# Patient Record
Sex: FEMALE | Race: WHITE
[De-identification: names, ages, dates, MRNs, and addresses within clinical notes are randomized per-mention and may not be internally consistent; named-entity substitution may affect disease eponyms.]

---

## 2020-01-18 ENCOUNTER — HOSPITAL ENCOUNTER (INPATIENT)
Dept: HOSPITAL 53 - M ED | Age: 20
LOS: 3 days | Discharge: HOME | DRG: 809 | End: 2020-01-21
Attending: INTERNAL MEDICINE | Admitting: INTERNAL MEDICINE
Payer: COMMERCIAL

## 2020-01-18 VITALS — DIASTOLIC BLOOD PRESSURE: 70 MMHG | SYSTOLIC BLOOD PRESSURE: 130 MMHG

## 2020-01-18 VITALS — SYSTOLIC BLOOD PRESSURE: 115 MMHG | DIASTOLIC BLOOD PRESSURE: 67 MMHG

## 2020-01-18 VITALS — HEIGHT: 64 IN | BODY MASS INDEX: 20.51 KG/M2 | WEIGHT: 120.15 LBS

## 2020-01-18 DIAGNOSIS — C76.2: ICD-10-CM

## 2020-01-18 DIAGNOSIS — Z88.0: ICD-10-CM

## 2020-01-18 DIAGNOSIS — R13.10: ICD-10-CM

## 2020-01-18 DIAGNOSIS — E87.6: ICD-10-CM

## 2020-01-18 DIAGNOSIS — R42: ICD-10-CM

## 2020-01-18 DIAGNOSIS — B37.81: ICD-10-CM

## 2020-01-18 DIAGNOSIS — D70.9: Primary | ICD-10-CM

## 2020-01-18 DIAGNOSIS — C79.51: ICD-10-CM

## 2020-01-18 DIAGNOSIS — Z79.899: ICD-10-CM

## 2020-01-18 DIAGNOSIS — D61.810: ICD-10-CM

## 2020-01-18 LAB
ALBUMIN SERPL BCG-MCNC: 2.5 GM/DL (ref 3.2–5.2)
ALT SERPL W P-5'-P-CCNC: 7 U/L (ref 12–78)
ANISOCYTOSIS BLD QL SMEAR: (no result)
APPEARANCE UR: (no result)
APTT BLD: 56 SECONDS (ref 25–38.4)
BACTERIA UR QL AUTO: NEGATIVE
BILIRUB CONJ SERPL-MCNC: 0.2 MG/DL (ref 0–0.2)
BILIRUB SERPL-MCNC: 0.8 MG/DL (ref 0.2–1)
BILIRUB UR QL STRIP.AUTO: NEGATIVE
BUN SERPL-MCNC: 12 MG/DL (ref 7–18)
CALCIUM SERPL-MCNC: 8.5 MG/DL (ref 8.5–10.1)
CHLORIDE SERPL-SCNC: 100 MEQ/L (ref 98–107)
CO2 SERPL-SCNC: 27 MEQ/L (ref 21–32)
CREAT SERPL-MCNC: 0.36 MG/DL (ref 0.55–1.3)
EOSINOPHIL NFR BLD MANUAL: 3 % (ref 0–3)
FERRITIN SERPL-MCNC: 490 NG/ML (ref 8–252)
FIBRINOGEN PPP-MCNC: 768 MG/DL (ref 221–452)
FLUAV RNA UPPER RESP QL NAA+PROBE: NEGATIVE
FLUBV RNA UPPER RESP QL NAA+PROBE: NEGATIVE
GIANT PLATELETS BLD QL SMEAR: (no result)
GLUCOSE SERPL-MCNC: 78 MG/DL (ref 70–100)
GLUCOSE UR QL STRIP.AUTO: NEGATIVE MG/DL
HCT VFR BLD AUTO: 27.3 % (ref 36–47)
HGB BLD-MCNC: 8.5 G/DL (ref 12–15.5)
HGB UR QL STRIP.AUTO: (no result)
INR PPP: 1.42
IRON SATN MFR SERPL: 26 % (ref 13.2–45)
IRON SERPL-MCNC: 53 UG/DL (ref 50–170)
KETONES UR QL STRIP.AUTO: (no result) MG/DL
LEUKOCYTE ESTERASE UR QL STRIP.AUTO: NEGATIVE
LYMPHOCYTES NFR BLD MANUAL: 70 % (ref 16–44)
MCH RBC QN AUTO: 26.6 PG (ref 27–33)
MCHC RBC AUTO-ENTMCNC: 31.1 G/DL (ref 32–36.5)
MCV RBC AUTO: 85.3 FL (ref 80–96)
METAMYELOCYTES NFR BLD MANUAL: 1 % (ref 0–0)
MONOCYTES NFR BLD MANUAL: 7 % (ref 0–5)
MUCOUS THREADS URNS QL MICRO: (no result)
NEUTROPHILS NFR BLD MANUAL: 4 % (ref 28–66)
NITRITE UR QL STRIP.AUTO: NEGATIVE
PH UR STRIP.AUTO: 5 UNITS (ref 5–9)
PLATELET # BLD AUTO: 62 10^3/UL (ref 150–450)
PLATELET BLD QL SMEAR: (no result)
POTASSIUM SERPL-SCNC: 3.7 MEQ/L (ref 3.5–5.1)
PROT SERPL-MCNC: 6.5 GM/DL (ref 6.4–8.2)
PROT UR QL STRIP.AUTO: (no result) MG/DL
PROTHROMBIN TIME: 17.1 SECONDS (ref 11.8–14)
RBC # BLD AUTO: 3.2 10^6/UL (ref 4–5.4)
RBC # UR AUTO: 182 /HPF (ref 0–3)
SODIUM SERPL-SCNC: 136 MEQ/L (ref 136–145)
SP GR UR STRIP.AUTO: 1.03 (ref 1–1.03)
SQUAMOUS #/AREA URNS AUTO: 0 /HPF (ref 0–6)
TIBC SERPL-MCNC: 204 UG/DL (ref 250–450)
UROBILINOGEN UR QL STRIP.AUTO: 2 MG/DL (ref 0–2)
VARIANT LYMPHS NFR BLD MANUAL: 7 % (ref 0–5)
WBC # BLD AUTO: 1 10^3/UL (ref 4–10)
WBC #/AREA URNS AUTO: 10 /HPF (ref 0–3)

## 2020-01-18 RX ADMIN — ACETAMINOPHEN SCH MG: 500 TABLET ORAL at 20:20

## 2020-01-18 RX ADMIN — GABAPENTIN SCH MG: 300 CAPSULE ORAL at 20:19

## 2020-01-18 RX ADMIN — MORPHINE SULFATE PRN MG: 100 TABLET, EXTENDED RELEASE ORAL at 20:18

## 2020-01-18 RX ADMIN — FLUCONAZOLE SCH MG: 100 TABLET ORAL at 20:19

## 2020-01-18 RX ADMIN — MEROPENEM AND SODIUM CHLORIDE SCH MLS/HR: 1 INJECTION, SOLUTION INTRAVENOUS at 22:00

## 2020-01-18 RX ADMIN — SODIUM CHLORIDE SCH MLS/HR: 9 INJECTION, SOLUTION INTRAVENOUS at 18:06

## 2020-01-18 RX ADMIN — DOCUSATE SODIUM SCH MG: 100 CAPSULE, LIQUID FILLED ORAL at 20:21

## 2020-01-18 NOTE — REP
PORTABLE CHEST X-RAY:  SITTING AP VIEW.

 

HISTORY:  Systemic inflammatory response syndrome.

 

FINDINGS:  Right hemidiaphragm is somewhat elevated, and there is some

homogeneous opacity in the right base suggesting possible small right pleural

effusion.  A right-sided Mpfxyj-I-Hscd catheter is noted in place with its tip in

the expected location of the superior vena cava.  The left lung is clear.

Pleural angles are sharp.  Pulmonary vasculature is not increased.

 

IMPRESSION:

 

Elevated right hemidiaphragm.  Hazy opacity right base suggestive of pleural

fluid.  Otherwise no acute disease.  Jhzixi-J-Jxge catheter.

 

 

Electronically Signed by

David Chaney MD 01/18/2020 03:07 P

## 2020-01-18 NOTE — HPEPDOC
[facility] Medical History & Physical


Date of Admission


Jan 18, 2020


Date of Service:  Jan 18, 2020


Attending Physician:  GONDAL,KHUBAIB N. MD





History and Physical


CHIEF COMPLAINT: Fever, dysphagia & dizziness





HISTORY OF PRESENT ILLNESS: 19 y.o female w/ recently diagnosed high grade 

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 

round of chemo ~1 week ago presents from home with low grade fever, dizziness, 

dysphagia & abdominal pain. She was diagnosed & treated at [place], felt 

fatigued post discharge but no other issues up until yesterday. Symptoms began 

with fever, worsening fatigue & abdominal pain. She was also being treated for 

oral thrush w/ nystatin and now reports dysphagia; she also has associated 

chills. She received Cyclophosphamide, Doxorubicin & vincristine on 1/9. She has

been giving herself Neupogen daily at home for the past 3 days. She followed up 

with our local oncologist (Dr. Wellington) two days ago at which time she was 

asymptomatic and advised to come to the hospital if she developed a fever. In 

the ED, she is found to have pancytopenia with Absolute neutrophil count of 120.

Of note, she had a vaginal delivery on 12/31/19 and has been having 

small/moderate amount of vaginal bleeding since then. She denies any CP, nausea,

vomiting or diarrhea at this time. She has not had a BM in the past 4 days. 





10 point review of system is negative except for above.





PAST MEDICAL HISTORY:


1. High grade malignant small blue round cell tumor


2. Hypothyroidism





PAST SURGICAL HISTORY:


1. Infusaport placement





SOCIAL HISTORY:


never smoker


denies alcohol use


denies drug use





FAMILY HISTORY:


negative for cancer or heart disease in parents





ALLERGIES: Please see below.





HOME MEDICATIONS: Please see below. 





PHYSICAL EXAMINATION:


VITAL SIGNS: See below


GENERAL APPEARANCE: No distress


HEENT: Moist mucous membranes, no oral thrush appreciated 


CARDIOVASCULAR: S1, S2, tachycardic, no murmurs appreciated 


LUNGS: clear to auscultation


ABDOMEN: soft, mild diffuse tenderness, mostly RLQ & Epigastric, +BS


EXTREMITIES: ROM intact


NEUROLOGICAL: No focal deficits


PSYCHIATRIC: calm & cooperative





LABORATORY DATA: See below.





IMAGING: CXR with RLL haziness, possible pleural effusions





MICROBIOLOGY: Please see below. 





ASSESSMENT: 19 y.o female with newly diagnosed malignancy s/p 1 round of chemo 

one week ago presents with neutropenic fever. 





PLAN:


1. Neutropenic fever


- supposedly febrile at home, received Tylenol in ED, , received Primaxin

in ED, continue Meropenem 1 gm TID as patient has penicillin allergy. Continue 

Filgrastim, will attempt to contact Oncologist (Dr. Wellington) for further 

recommendations, if any. 





2. Dysphagia


- was being treated for Oral candidiasis outpatient, possibly has esophageal 

candidiasis based on symptoms, will treated w/ Fluconazole 200 mg daily. 

Continue home PPI





3. Dizziness


- likely due to a combination of blood loss, dehydration due to poor oral intake

& chemotherapy, continue IV hydration.





4. Pancytopenia


- likely due to chemotherapy along with worsening of anemia from vaginal 

bleeding, continue Filgrastim, will monitor H/H & platelets for now, check coags

& iron studies. 





5. Vaginal bleeding


- post uncomplicated vaginal delivery 18 days ago, mild/moderate, will monitor 

for now, if worsening/concerning will consider OB eval.





DVT Prophylaxis - AGUSTIN/SCDs, will avoid chemical prophylaxis due to vaginal 

bleeding/thrombocytopenia


GI Prophylaxis - Protonix





Vital Signs





Vital Signs








  Date Time  Temp Pulse Resp B/P (MAP) Pulse Ox O2 Delivery O2 Flow Rate FiO2


 


1/18/20 16:58 99.4 109 18 133/72 (92) 94   


 


1/18/20 15:15      Room Air  











Laboratory Data


Labs 24H


Laboratory Tests 2


1/18/20 13:36: 


Immature Granulocyte % (Auto) , Neutrophils # (Auto) , Nucleated Red Blood Cells

% (auto) 2.9H, Neutrophils 4L, Band Neutrophils 8, Lymphocytes (Manual) 70H, 

Monocytes (Manual) 7H, Eosinophils (Manual) 3, Metamyelocytes 1H, Atypical 

Lymphocytes 7H, Anisocytosis 1+, Giant Platelets 1+, Platelet Estimate 

DECREASED, Immature Platelet Fraction 5.4, Urine Color JASMEET, Urine Appearance 

HAZY, Urine pH 5.0, Urine Specific Gravity 1.028, Urine Protein 1+H, Urine 

Glucose (Auto)(UA) NEGATIVE, Urine Ketones (Auto) 1+H, Urine Blood 3+H, Urine 

Nitrite NEGATIVE, Urine Bilirubin NEGATIVE, Urine Urobilinogen 2.0H, Urine 

Leukocyte Esterase (Auto) NEGATIVE, Urine WBC (Auto) 10H, Urine RBC (Auto) 182H,

Urine Hyaline Casts (Auto) 0, Urine Bacteria (Auto) NEGATIVE, Urine Squamous 

Epithelial Cells 0, Urine Mucus (Auto) SMALL, Urine Sperm (Auto) , Anion Gap 9, 

Lactic Acid Level 0.8, Calcium Level 8.5, Total Bilirubin 0.8, Direct Bilirubin 

0.2, Aspartate Amino Transf (AST/SGOT) 13, Alanine Aminotransferase (ALT/SGPT) 

7L, Alkaline Phosphatase 439H, Total Protein 6.5, Albumin 2.5L, Albumin/Globulin

Ratio 0.63L, Influenza Type A (RT-PCR) NEGATIVE, Influenza Type B (RT-PCR) 

NEGATIVE


CBC/BMP


Laboratory Tests


1/18/20 13:36








Microbiology





Microbiology


1/18/20 Urine Culture, Received


          Pending


1/18/20 Eye/Ear/Nose/Throat Culture, Received


          Pending


1/18/20 Blood Culture, Received


          Pending


1/18/20 Blood Culture, Received


          Pending





Home Medications


Scheduled


Acetaminophen (Acetaminophen) 500 Mg Tablet, 1,000 MG PO TID


Docusate Sodium (Colace) 100 Mg Capsule, 100 MG PO BID


   2ND DOSE AT 1700 


Doxycycline Hyclate (Doxycycline Hyclate) 100 Mg Capsule, 100 MG PO BID


Escitalopram Oxalate (Lexapro) 5 Mg Tablet, 5 MG PO DAILY


Filgrastim-Sndz (Zarxio) 300 Mcg/0.5 Ml Syringe, 300 MCG SC DAILY


   INJECT INTO THE SKIN EVERY 24 HOURS FOR 7 DAYS STARTING 01/16/2020 


Gabapentin (Neurontin) 300 Mg Capsule, 300 MG PO QHS


Levothyroxine Sodium (Levo-T) 75 Mcg Tablet, 75 MCG PO DAILY


Nystatin (Nystatin Oral Susp) 100,000 Unit/1 Ml Oral.susp, 5 ML PO QID


   SWISH AND HOLD IN MOUTH BEFORE SWALLOWING 


Pantoprazole Sodium (Protonix) 40 Mg Granpkt.dr, 40 MG PO DAILY


Polyethylene Glycol 3350 (Miralax) 119 Gm Powder, 17 GRAM PO BID


   MIX INTO WATER AND JUICE 





Scheduled PRN


Diazepam (Diazepam) 2 Mg Tablet, 2 MG PO TID PRN for ANXIETY


Hydromorphone HCl (Dilaudid) 2 Mg Tablet, 2 MG PO Q1H PRN for pain


   UP TO 6 TABLETS PER DAY 


Morphine Sulfate (Morphine Sulfate ER) 100 Mg Tablet.er, 100 MG PO BID PRN for 

pain


Ondansetron HCl (Zofran) 8 Mg Tablet, 8 MG PO TID PRN for NAUSEA OR VOMITING


Prochlorperazine (Prochlorperazine Maleate) 5 Mg Tablet, 10 MG PO Q6H PRN for 

NAUSEA


Sennosides (Senna) 8.6 Mg Tablet, 17.2 MG PO BID PRN for CONSTIPATION





Allergies


Coded Allergies:  


     Penicillins (Verified  Allergy, Intermediate, hives, 1/18/20)





A-FIB/CHADSVASC


A-FIB History


Current/History of A-Fib/PAF?:  No











GONDAL,KHUBAIB N. MD           Jan 18, 2020 17:25

## 2020-01-19 VITALS — DIASTOLIC BLOOD PRESSURE: 77 MMHG | SYSTOLIC BLOOD PRESSURE: 125 MMHG

## 2020-01-19 VITALS — DIASTOLIC BLOOD PRESSURE: 74 MMHG | SYSTOLIC BLOOD PRESSURE: 120 MMHG

## 2020-01-19 VITALS — DIASTOLIC BLOOD PRESSURE: 72 MMHG | SYSTOLIC BLOOD PRESSURE: 111 MMHG

## 2020-01-19 VITALS — SYSTOLIC BLOOD PRESSURE: 121 MMHG | DIASTOLIC BLOOD PRESSURE: 88 MMHG

## 2020-01-19 VITALS — SYSTOLIC BLOOD PRESSURE: 121 MMHG | DIASTOLIC BLOOD PRESSURE: 64 MMHG

## 2020-01-19 VITALS — DIASTOLIC BLOOD PRESSURE: 69 MMHG | SYSTOLIC BLOOD PRESSURE: 119 MMHG

## 2020-01-19 VITALS — SYSTOLIC BLOOD PRESSURE: 118 MMHG | DIASTOLIC BLOOD PRESSURE: 60 MMHG

## 2020-01-19 VITALS — SYSTOLIC BLOOD PRESSURE: 117 MMHG | DIASTOLIC BLOOD PRESSURE: 63 MMHG

## 2020-01-19 VITALS — DIASTOLIC BLOOD PRESSURE: 71 MMHG | SYSTOLIC BLOOD PRESSURE: 119 MMHG

## 2020-01-19 VITALS — DIASTOLIC BLOOD PRESSURE: 70 MMHG | SYSTOLIC BLOOD PRESSURE: 120 MMHG

## 2020-01-19 LAB
ALBUMIN SERPL BCG-MCNC: 2 GM/DL (ref 3.2–5.2)
ALT SERPL W P-5'-P-CCNC: 6 U/L (ref 12–78)
BILIRUB SERPL-MCNC: 0.4 MG/DL (ref 0.2–1)
BUN SERPL-MCNC: 7 MG/DL (ref 7–18)
CALCIUM SERPL-MCNC: 8.2 MG/DL (ref 8.5–10.1)
CHLORIDE SERPL-SCNC: 105 MEQ/L (ref 98–107)
CO2 SERPL-SCNC: 26 MEQ/L (ref 21–32)
CREAT SERPL-MCNC: 0.28 MG/DL (ref 0.55–1.3)
FIBRINOGEN PPP-MCNC: 778 MG/DL (ref 221–452)
GLUCOSE SERPL-MCNC: 83 MG/DL (ref 70–100)
HCT VFR BLD AUTO: 22.9 % (ref 36–47)
HCT VFR BLD AUTO: 28.1 % (ref 36–47)
HGB BLD-MCNC: 7.1 G/DL (ref 12–15.5)
HGB BLD-MCNC: 8.8 G/DL (ref 12–15.5)
INR PPP: 1.46
MAGNESIUM SERPL-MCNC: 2 MG/DL (ref 1.8–2.4)
MCH RBC QN AUTO: 27 PG (ref 27–33)
MCHC RBC AUTO-ENTMCNC: 31 G/DL (ref 32–36.5)
MCV RBC AUTO: 87.1 FL (ref 80–96)
PLATELET # BLD AUTO: 48 10^3/UL (ref 150–450)
POTASSIUM SERPL-SCNC: 3.7 MEQ/L (ref 3.5–5.1)
PROT SERPL-MCNC: 6 GM/DL (ref 6.4–8.2)
PROTHROMBIN TIME: 17.5 SECONDS (ref 11.8–14)
RBC # BLD AUTO: 2.63 10^6/UL (ref 4–5.4)
SODIUM SERPL-SCNC: 139 MEQ/L (ref 136–145)
WBC # BLD AUTO: 1.2 10^3/UL (ref 4–10)

## 2020-01-19 RX ADMIN — ACETAMINOPHEN SCH MG: 500 TABLET ORAL at 09:20

## 2020-01-19 RX ADMIN — GABAPENTIN SCH MG: 300 CAPSULE ORAL at 21:09

## 2020-01-19 RX ADMIN — DOCUSATE SODIUM SCH MG: 100 CAPSULE, LIQUID FILLED ORAL at 16:54

## 2020-01-19 RX ADMIN — DOCUSATE SODIUM SCH MG: 100 CAPSULE, LIQUID FILLED ORAL at 09:20

## 2020-01-19 RX ADMIN — MEROPENEM AND SODIUM CHLORIDE SCH MLS/HR: 1 INJECTION, SOLUTION INTRAVENOUS at 14:34

## 2020-01-19 RX ADMIN — MORPHINE SULFATE SCH MG: 100 TABLET, EXTENDED RELEASE ORAL at 21:10

## 2020-01-19 RX ADMIN — MORPHINE SULFATE PRN MG: 100 TABLET, EXTENDED RELEASE ORAL at 09:22

## 2020-01-19 RX ADMIN — SODIUM CHLORIDE SCH MLS/HR: 9 INJECTION, SOLUTION INTRAVENOUS at 04:43

## 2020-01-19 RX ADMIN — LEVOTHYROXINE SODIUM SCH MCG: 75 TABLET ORAL at 05:44

## 2020-01-19 RX ADMIN — FLUCONAZOLE SCH MG: 100 TABLET ORAL at 09:20

## 2020-01-19 RX ADMIN — ESCITSLOPRAM SCH MG: 5 TABLET ORAL at 09:19

## 2020-01-19 RX ADMIN — ACETAMINOPHEN SCH MG: 500 TABLET ORAL at 16:54

## 2020-01-19 RX ADMIN — Medication SCH EA: at 09:19

## 2020-01-19 RX ADMIN — MEROPENEM AND SODIUM CHLORIDE SCH MLS/HR: 1 INJECTION, SOLUTION INTRAVENOUS at 05:44

## 2020-01-19 RX ADMIN — MEROPENEM AND SODIUM CHLORIDE SCH MLS/HR: 1 INJECTION, SOLUTION INTRAVENOUS at 21:10

## 2020-01-19 RX ADMIN — PANTOPRAZOLE SODIUM SCH MG: 40 TABLET, DELAYED RELEASE ORAL at 09:19

## 2020-01-19 RX ADMIN — PROCHLORPERAZINE MALEATE PRN MG: 5 TABLET ORAL at 21:37

## 2020-01-19 RX ADMIN — ACETAMINOPHEN SCH MG: 500 TABLET ORAL at 21:09

## 2020-01-19 RX ADMIN — PROCHLORPERAZINE MALEATE PRN MG: 5 TABLET ORAL at 15:09

## 2020-01-19 NOTE — REP
CT ABDOMEN AND PELVIS WITHOUT IV OR ORAL CONTRAST:

 

HISTORY:  Abdomen pain.  Worsening anemia.  No comparison imaging.  The patient

gives a history of desmoplastic sarcoma.

 

FINDINGS:

 

Preliminary  radiograph demonstrates soft tissue fullness throughout the

upper abdomen, consistent with hepatosplenomegaly.  Bowel gas pattern is

unremarkable.  There is small to moderate right pleural effusion noted.  The left

lung base is clear.  There is mild compressive atelectasis in the right base.

Tip of an Scstvm-T-Tqer catheter is noted in place in the SVC right atrial

junction.

 

There is relatively high attenuation fluid in the perihepatic region of the upper

abdomen.  There is some ascitic fluid in the pelvic reflections in the lower

abdomen as well.  This fluid has a higher attenuation than the pleural fluid on

the right suggesting the possibility of hemoperitoneum.

 

The liver is enlarged with a craniocaudal span of 18 cm in the midclavicular

line.  The spleen is enlarged as well measuring 13.7 cm in greatest transverse

dimension.  No focal hepatic or splenic lesion is seen.  There is opaque material

layering in the dependent portion of the gallbladder consistent with sludge

and/or cholelithiasis.  There is fairly bulky adenopathy throughout the upper

abdomen, retroperitoneum, and lower pelvic reflections.  Multiple peritoneal

nodules appear to be present in the anterior abdomen inferiorly surrounded by

some fluid.  These range up to 4 cm in greatest diameter.  There are multiple

epicardial lymph nodes which are enlarged to the right of the heart.  The largest

of these measures 1.8 x 2.6 cm.  There is confluent periaortic and upper

abdominal adenopathy.  No renal lesion is seen.  No abdominal wall defect is

seen.  There is moderate stool throughout the colon.  No gastrointestinal

obstructive lesion is seen.  Bone window settings demonstrate diffuse mixed

density, predominantly sclerotic skeletal disease, consistent with widespread

skeletal metastatic disease.

 

IMPRESSION:

 

Hepatosplenomegaly.  Extensive bulky upper and lower abdominal lymphadenopathy.

There is a small quantity of high attenuation ascites suggesting hemoperitoneum.

There is a small to moderate right pleural effusion of lower density.  There is

widespread essentially diffuse skeletal metastatic disease.

 

 

Electronically Signed by

David Chaney MD 01/19/2020 02:30 P

## 2020-01-19 NOTE — IPNPDOC
Date Seen


The patient was seen on 1/19/20.





Progress Note


HISTORY OF PRESENT ILLNESS: 19 y.o female w/ recently diagnosed high grade 

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 

round of chemo ~1 week ago presents from home with low grade fever, dizziness, 

dysphagia & abdominal pain. She was diagnosed & treated at [place], felt 

fatigued post discharge but no other issues up until yesterday. Symptoms began 

with fever, worsening fatigue & abdominal pain. She was also being treated for 

oral thrush w/ nystatin and now reports dysphagia; she also has associated 

chills. She received Cyclophosphamide, Doxorubicin & vincristine on 1/9. She has

been giving herself Neupogen daily at home for the past 3 days. She followed up 

with our local oncologist (Dr. Wellington) two days ago at which time she was 

asymptomatic and advised to come to the hospital if she developed a fever. In 

the ED, she is found to have pancytopenia with Absolute neutrophil count of 120.

Of note, she had a vaginal delivery on 12/31/19 and has been having 

small/moderate amount of vaginal bleeding since then. She denies any CP, nausea,

vomiting or diarrhea at this time. She has not had a BM in the past 4 days. 





1/19/20


Patient without complaints, did well overnight, continues to have small amount 

of vaginal bleeding, no other complaints. She reports improvement in dysphagia 

and abdominal discomfort. She has not passed any flatus since yesterday, no 

bowel movements for the past 5 days. She denies any shortness of breath, chest 

pain, nausea, vomiting or headache.





10 point review of system is negative except for above.





PHYSICAL EXAMINATION:


VITAL SIGNS: See below


GENERAL APPEARANCE: No distress


HEENT: Moist mucous membranes, no oral thrush appreciated 


CARDIOVASCULAR: S1, S2, no murmurs appreciated 


LUNGS: clear to auscultation


ABDOMEN: soft, mild right lower quadrant tenderness, hypoactive bowel sounds


EXTREMITIES: ROM intact


NEUROLOGICAL: No focal deficits


PSYCHIATRIC: calm & cooperative





LABORATORY DATA: See below.





IMAGING: CXR with RLL haziness, possible pleural effusions





MICROBIOLOGY: Please see below. 





ASSESSMENT: 19 y.o female with newly diagnosed malignancy s/p 1 round of chemo 

one week ago presents with neutropenic fever. 





PLAN:


1. Neutropenic fever


- supposedly febrile at home, received Tylenol in ED, , received Primaxin

in ED, continue Meropenem 1 gm TID as patient has penicillin allergy. Continue 

Filgrastim, discussed with oncologist, Dr. Gilliam at [place], no further 

recommendations at this time.





2. Dysphagia


- was being treated for Oral candidiasis outpatient, possibly has esophageal 

candidiasis based on symptoms, will treated w/ Fluconazole 200 mg daily. 

Continue home PPI





4. Pancytopenia


- likely due to chemotherapy along with worsening of anemia from vaginal 

bleeding, continue Filgrastim, transfuse 1 unit of packed red blood cells, 

monitor platelets for now, coag studies negative for DIC. 





5. Vaginal bleeding


- post uncomplicated vaginal delivery 19 days ago, reports improvement in 

vaginal bleeding, currently having minimal bleeding, will monitor for now. CT 

abdomen and pelvis ordered given worsening anemia, hypoactive bowel sounds and 

right lower quadrant pain. 





DVT Prophylaxis - AGUSTIN/SCDs, will avoid chemical prophylaxis due to vaginal 

bleeding/thrombocytopenia


GI Prophylaxis - Protonix





VS, I&O, 24H, Fishbone


Vital Signs/I&O





Vital Signs








  Date Time  Temp Pulse Resp B/P (MAP) Pulse Ox O2 Delivery O2 Flow Rate FiO2


 


1/19/20 09:22   16  98 Room Air  


 


1/19/20 08:00 98.9 125  118/60 (79)    














I&O- Last 24 Hours up to 6 AM 


 


 1/19/20





 06:00


 


Intake Total 2150 ml


 


Output Total 500 ml


 


Balance 1650 ml











Laboratory Data


24H LABS


Laboratory Tests 2


1/18/20 13:36: 


Immature Granulocyte % (Auto) , Neutrophils # (Auto) , Nucleated Red Blood Cells

% (auto) 2.9H, Neutrophils 4L, Band Neutrophils 8, Lymphocytes (Manual) 70H, Mo

nocytes (Manual) 7H, Eosinophils (Manual) 3, Metamyelocytes 1H, Atypical 

Lymphocytes 7H, Anisocytosis 1+, Giant Platelets 1+, Platelet Estimate 

DECREASED, Immature Platelet Fraction 5.4, Urine Color JASMEET, Urine Appearance 

HAZY, Urine pH 5.0, Urine Specific Gravity 1.028, Urine Protein 1+H, Urine Gl

ucose (Auto)(UA) NEGATIVE, Urine Ketones (Auto) 1+H, Urine Blood 3+H, Urine 

Nitrite NEGATIVE, Urine Bilirubin NEGATIVE, Urine Urobilinogen 2.0H, Urine 

Leukocyte Esterase (Auto) NEGATIVE, Urine WBC (Auto) 10H, Urine RBC (Auto) 182H,

Urine Hyaline Casts (Auto) 0, Urine Bacteria (Auto) NEGATIVE, Urine Squamous 

Epithelial Cells 0, Urine Mucus (Auto) SMALL, Urine Sperm (Auto) , Anion Gap 9, 

Lactic Acid Level 0.8, Calcium Level 8.5, Total Bilirubin 0.8, Direct Bilirubin 

0.2, Aspartate Amino Transf (AST/SGOT) 13, Alanine Aminotransferase (ALT/SGPT) 

7L, Alkaline Phosphatase 439H, Total Protein 6.5, Albumin 2.5L, Albumin/Globulin

Ratio 0.63L, Influenza Type A (RT-PCR) NEGATIVE, Influenza Type B (RT-PCR) 

NEGATIVE


1/18/20 18:12: 


Prothrombin Time 17.1H, Prothromb Time International Ratio 1.42, Activated 

Partial Thromboplast Time 56.0H, Fibrinogen 768H, Iron Level 53, Total Iron 

Binding Capacity 204L, Transferrin % Saturation 26.0, Ferritin 490H


1/19/20 05:14: 


Nucleated Red Blood Cells % (auto) 3.4H, Anion Gap 8, Calcium Level 8.2L, Total 

Bilirubin 0.4, Aspartate Amino Transf (AST/SGOT) 9, Alanine Aminotransferase 

(ALT/SGPT) 6L, Alkaline Phosphatase 368H, Total Protein 6.0L, Albumin 2.0L, 

Albumin/Globulin Ratio 0.50L, Prothrombin Time 17.5H, Prothromb Time 

International Ratio 1.46, Fibrinogen 778H, Magnesium Level 2.0


CBC/BMP


Laboratory Tests


1/18/20 13:36








1/19/20 05:14








Microbiology





Microbiology


1/18/20 Urine Culture - Final, Complete


          


1/18/20 Eye/Ear/Nose/Throat Culture - Final, Complete


          


1/18/20 Blood Culture, Received


          Pending


1/18/20 Blood Culture, Received


          Pending











GONDAL,KHUBAIB N. MD           Jan 19, 2020 10:37

## 2020-01-20 VITALS — SYSTOLIC BLOOD PRESSURE: 121 MMHG | DIASTOLIC BLOOD PRESSURE: 69 MMHG

## 2020-01-20 VITALS — SYSTOLIC BLOOD PRESSURE: 120 MMHG | DIASTOLIC BLOOD PRESSURE: 70 MMHG

## 2020-01-20 VITALS — SYSTOLIC BLOOD PRESSURE: 113 MMHG | DIASTOLIC BLOOD PRESSURE: 70 MMHG

## 2020-01-20 VITALS — SYSTOLIC BLOOD PRESSURE: 117 MMHG | DIASTOLIC BLOOD PRESSURE: 63 MMHG

## 2020-01-20 VITALS — DIASTOLIC BLOOD PRESSURE: 63 MMHG | SYSTOLIC BLOOD PRESSURE: 117 MMHG

## 2020-01-20 VITALS — SYSTOLIC BLOOD PRESSURE: 117 MMHG | DIASTOLIC BLOOD PRESSURE: 64 MMHG

## 2020-01-20 LAB
BUN SERPL-MCNC: 4 MG/DL (ref 7–18)
CALCIUM SERPL-MCNC: 8.3 MG/DL (ref 8.5–10.1)
CHLORIDE SERPL-SCNC: 105 MEQ/L (ref 98–107)
CO2 SERPL-SCNC: 30 MEQ/L (ref 21–32)
CREAT SERPL-MCNC: 0.32 MG/DL (ref 0.55–1.3)
GLUCOSE SERPL-MCNC: 99 MG/DL (ref 70–100)
HCT VFR BLD AUTO: 26 % (ref 36–47)
HGB BLD-MCNC: 8.2 G/DL (ref 12–15.5)
MAGNESIUM SERPL-MCNC: 2.1 MG/DL (ref 1.8–2.4)
MCH RBC QN AUTO: 27.2 PG (ref 27–33)
MCHC RBC AUTO-ENTMCNC: 31.5 G/DL (ref 32–36.5)
MCV RBC AUTO: 86.4 FL (ref 80–96)
PLATELET # BLD AUTO: 51 10^3/UL (ref 150–450)
POTASSIUM SERPL-SCNC: 3.2 MEQ/L (ref 3.5–5.1)
RBC # BLD AUTO: 3.01 10^6/UL (ref 4–5.4)
SODIUM SERPL-SCNC: 142 MEQ/L (ref 136–145)
WBC # BLD AUTO: 2.5 10^3/UL (ref 4–10)

## 2020-01-20 RX ADMIN — PANTOPRAZOLE SODIUM SCH MG: 40 TABLET, DELAYED RELEASE ORAL at 08:57

## 2020-01-20 RX ADMIN — PROCHLORPERAZINE MALEATE PRN MG: 5 TABLET ORAL at 09:40

## 2020-01-20 RX ADMIN — MORPHINE SULFATE SCH MG: 100 TABLET, EXTENDED RELEASE ORAL at 21:05

## 2020-01-20 RX ADMIN — PROCHLORPERAZINE MALEATE PRN MG: 5 TABLET ORAL at 16:22

## 2020-01-20 RX ADMIN — DOCUSATE SODIUM SCH MG: 100 CAPSULE, LIQUID FILLED ORAL at 08:57

## 2020-01-20 RX ADMIN — ACETAMINOPHEN SCH MG: 500 TABLET ORAL at 16:22

## 2020-01-20 RX ADMIN — DOCUSATE SODIUM SCH MG: 100 CAPSULE, LIQUID FILLED ORAL at 16:22

## 2020-01-20 RX ADMIN — GABAPENTIN SCH MG: 300 CAPSULE ORAL at 21:05

## 2020-01-20 RX ADMIN — MEROPENEM AND SODIUM CHLORIDE SCH MLS/HR: 1 INJECTION, SOLUTION INTRAVENOUS at 06:13

## 2020-01-20 RX ADMIN — Medication SCH EA: at 09:07

## 2020-01-20 RX ADMIN — ACETAMINOPHEN SCH MG: 500 TABLET ORAL at 21:05

## 2020-01-20 RX ADMIN — MEROPENEM AND SODIUM CHLORIDE SCH MLS/HR: 1 INJECTION, SOLUTION INTRAVENOUS at 14:05

## 2020-01-20 RX ADMIN — MEROPENEM AND SODIUM CHLORIDE SCH MLS/HR: 1 INJECTION, SOLUTION INTRAVENOUS at 21:04

## 2020-01-20 RX ADMIN — SODIUM CHLORIDE, PRESERVATIVE FREE SCH ML: 5 INJECTION INTRAVENOUS at 21:07

## 2020-01-20 RX ADMIN — MORPHINE SULFATE SCH MG: 100 TABLET, EXTENDED RELEASE ORAL at 08:59

## 2020-01-20 RX ADMIN — ESCITSLOPRAM SCH MG: 5 TABLET ORAL at 08:57

## 2020-01-20 RX ADMIN — ACETAMINOPHEN SCH MG: 500 TABLET ORAL at 08:59

## 2020-01-20 RX ADMIN — FLUCONAZOLE SCH MG: 100 TABLET ORAL at 08:57

## 2020-01-20 RX ADMIN — LEVOTHYROXINE SODIUM SCH MCG: 75 TABLET ORAL at 06:13

## 2020-01-20 NOTE — ECHO
DATE OF STUDY:  01/18/2020

REFERRING PHYSICIAN:  Dr. Khubaib Gondal

INDICATION:  Cardiac dysrhythmias unspecified.

HEIGHT:  163 cm.

WEIGHT:  55 kg.

 

2-D MEASUREMENTS:

LVOT:  2.1 cm

Aortic root:  2.5 cm

Left atrium:  2.5 cm

Ventricular septum:  0.77 cm

Posterior wall:  0.87 cm

Left ventricle diastole:  4.3 cm

Inferior vena cava:  1.0 cm

 

DOPPLER MEASUREMENTS:

Aortic valve velocity:  128 cm/sec

LVOT velocity:  79.0 cm/sec

No aortic regurgitation

No mitral regurgitation

Mitral E velocity:  72.0 cm/sec

Mitral A velocity:  59.1 cm/sec

Trace tricuspid regurgitation

Trace pulmonic regurgitation

Pulmonary artery acceleration time:  127 ms

 

MITRAL ANNULAR TISSUE DOPPLER

E prime septal:  11.4 cm/sec

E prime lateral:  11.5 cm/sec

 

DESCRIPTION:

The rhythm was sinus tachycardia.  Image quality was good.

 

CONCLUSIONS:

1.  Tiny pericardial effusion.

2.  Normal left ventricle internal dimensions and wall thickness.  Normal

regional LV wall motion and wall thickening.  Normal LV systolic function.  LVEF

65-70% by visual estimate.  Normal LV diastolic function.

3.  Presence of a central line tip identified in the right atrium.

4.  Otherwise normal appearing echocardiogram Doppler.

## 2020-01-20 NOTE — IPNPDOC
Subjective


Date Seen


The patient was seen on 1/20/20.





Subjective


Chief Complaint/HPI


Patient is comfortable offers no new complaints at the present time


General:  Denies: ROS Unobtainable, Chills, Night Sweats, Fatigue, Malaise, 

Normal Appetite, Other Symptoms


Constitutional:  Denies: Chills, Fever, Malaise, Night Sweats, Weakness, 

Fatigue, Weight Loss, Lethargy, Other


Pulmonary:  Denies: Dyspnea, Cough, Pleuritic Chest Pain, Other Symptoms


Cardiovascular:  Denies: Chest Pain, Palpitations, Orthopnea, Paroxysmal Noc. 

Dyspnea, Edema, Lt Headedness, Other Symptoms


Gastrointestinal:  Denies: Nausea, Vomiting, Abdominal Pain, Diarrhea, 

Constipation, Melena, Hematochezia, Other Symptoms


Musculoskeletal:  Denies: Neck Pain, Back Pain, Shoulder Pain, Arm Pain, Hand 

Pain, Leg Pain, Foot Pain, Joint Pain, Muscle Pain, Spasms, Other Symptoms


Neurological:  Denies: Weakness, Numbness, Incoordination, Change in speech, 

Confusion, Seizures, Other Symptoms





Objective


Physical Examination


General Exam:  Positive: Alert, Cooperative


Eye Exam:  Positive: PERRLA, Conjunctiva & lids normal


Chest Exam:  Positive: Clear to auscultation, Normal air movement


Heart Exam:  Positive: Rate Normal, Normal S1, Normal S2


Abdomen Exam:  Positive: Normal bowel sounds, Soft


Extremity Exam:  Positive: Normal pulses


Skin Exam:  Positive: Breakdown





Assessment /Plan


Problems





(1) Febrile neutropenia


Status:  Acute


Problem Text:  supposedly febrile at home, received Tylenol in ED, , 

received Primaxin in ED, continue Meropenem 1 gm TID as patient has penicillin 

allergy. Continue Filgrastim, discussed with oncologist, Dr. Gilliam at [place], 

no further recommendations at this time.


Patient's WBC count is 2.5 which is improved from previous. WBC count


Will monitor 1 more day and repeat CBC in a.m. if WBC count is improving and 

patient can be discharged home





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


Status:  Chronic


Problem Text:  Patient is receiving chemotherapy at [place]


Follow-up with her oncologist in [place]. Once discharged





(3) Hypokalemia


Status:  Acute


Problem Text:  KCl 40 mEq by mouth 1 given





(4) Esophageal candidiasis


Status:  Acute


Problem Text:  Continue Diflucan by mouth








Plan/VTE


VTE Prophylaxis Ordered?:  Yes





VS, I&O, 24H, Fishbone


Vital Signs/I&O





Vital Signs








  Date Time  Temp Pulse Resp B/P (MAP) Pulse Ox O2 Delivery O2 Flow Rate FiO2


 


1/20/20 08:59   17   Room Air  


 


1/20/20 08:00 98.0 102  117/64 (81) 95   














I&O- Last 24 Hours up to 6 AM 


 


 1/20/20





 06:00


 


Intake Total 588 ml


 


Output Total 1000 ml


 


Balance -412 ml











Laboratory Data


24H LABS


Laboratory Tests 2


1/20/20 04:59: 


Nucleated Red Blood Cells % (auto) 1.6H, Immature Platelet Fraction 8.1, Anion 

Gap 7L, Calcium Level 8.3L, Magnesium Level 2.1


CBC/BMP


Laboratory Tests


1/19/20 19:47








1/20/20 04:59








Microbiology





Microbiology


1/18/20 Urine Culture - Final, Complete


          


1/18/20 Eye/Ear/Nose/Throat Culture - Final, Complete


          


1/18/20 Blood Culture - Preliminary, Resulted


          No growth after 24 hours . All specim...


1/18/20 Blood Culture - Preliminary, Resulted


          No growth after 24 hours . All specim...











ANAY ART MD              Jan 20, 2020 10:57

## 2020-01-21 VITALS — DIASTOLIC BLOOD PRESSURE: 82 MMHG | SYSTOLIC BLOOD PRESSURE: 137 MMHG

## 2020-01-21 VITALS — DIASTOLIC BLOOD PRESSURE: 71 MMHG | SYSTOLIC BLOOD PRESSURE: 119 MMHG

## 2020-01-21 LAB
BUN SERPL-MCNC: 5 MG/DL (ref 7–18)
CALCIUM SERPL-MCNC: 8.3 MG/DL (ref 8.5–10.1)
CHLORIDE SERPL-SCNC: 108 MEQ/L (ref 98–107)
CO2 SERPL-SCNC: 30 MEQ/L (ref 21–32)
CREAT SERPL-MCNC: 0.35 MG/DL (ref 0.55–1.3)
GLUCOSE SERPL-MCNC: 73 MG/DL (ref 70–100)
HCT VFR BLD AUTO: 26.2 % (ref 36–47)
HGB BLD-MCNC: 7.9 G/DL (ref 12–15.5)
MAGNESIUM SERPL-MCNC: 2.1 MG/DL (ref 1.8–2.4)
MCH RBC QN AUTO: 26.5 PG (ref 27–33)
MCHC RBC AUTO-ENTMCNC: 30.2 G/DL (ref 32–36.5)
MCV RBC AUTO: 87.9 FL (ref 80–96)
PLATELET # BLD AUTO: 51 10^3/UL (ref 150–450)
POTASSIUM SERPL-SCNC: 3.4 MEQ/L (ref 3.5–5.1)
RBC # BLD AUTO: 2.98 10^6/UL (ref 4–5.4)
SODIUM SERPL-SCNC: 144 MEQ/L (ref 136–145)
WBC # BLD AUTO: 4.3 10^3/UL (ref 4–10)

## 2020-01-21 RX ADMIN — LEVOTHYROXINE SODIUM SCH MCG: 75 TABLET ORAL at 05:37

## 2020-01-21 RX ADMIN — SODIUM CHLORIDE, PRESERVATIVE FREE SCH ML: 5 INJECTION INTRAVENOUS at 05:37

## 2020-01-21 RX ADMIN — ACETAMINOPHEN SCH MG: 500 TABLET ORAL at 08:49

## 2020-01-21 RX ADMIN — DOCUSATE SODIUM SCH MG: 100 CAPSULE, LIQUID FILLED ORAL at 08:50

## 2020-01-21 RX ADMIN — FLUCONAZOLE SCH MG: 100 TABLET ORAL at 08:50

## 2020-01-21 RX ADMIN — MEROPENEM AND SODIUM CHLORIDE SCH MLS/HR: 1 INJECTION, SOLUTION INTRAVENOUS at 05:37

## 2020-01-21 RX ADMIN — PROCHLORPERAZINE MALEATE PRN MG: 5 TABLET ORAL at 08:53

## 2020-01-21 RX ADMIN — ESCITSLOPRAM SCH MG: 5 TABLET ORAL at 08:49

## 2020-01-21 RX ADMIN — Medication SCH EA: at 09:00

## 2020-01-21 RX ADMIN — MORPHINE SULFATE SCH MG: 100 TABLET, EXTENDED RELEASE ORAL at 08:48

## 2020-01-21 RX ADMIN — PANTOPRAZOLE SODIUM SCH MG: 40 TABLET, DELAYED RELEASE ORAL at 08:49

## 2020-01-21 NOTE — DS.PDOC
Discharge Summary


General


Date of Admission


Jan 18, 2020 at 16:37


Date of Discharge


1/21/20





Discharge Summary


PROCEDURES PERFORMED DURING STAY: None.





ADMITTING DIAGNOSES: 


1. Febrile neutropenia.





DISCHARGE DIAGNOSES:


1. 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





COMPLICATIONS/CHIEF COMPLAINT: Febrile Neutropenia Malignant.





HISTORY OF PRESENT ILLNESS:  19 y.o female w/ recently diagnosed high grade 

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 

round of chemo ~1 week ago presents from home with low grade fever, dizziness, 

dysphagia & abdominal pain. She was diagnosed & treated at [facility], felt 

fatigued post discharge but no other issues up until yesterday. Symptoms began 

with fever, worsening fatigue & abdominal pain. She was also being treated for 

oral thrush w/ nystatin and now reports dysphagia; she also has associated chi

lls. She received Cyclophosphamide, Doxorubicin & vincristine on 1/9. She has 

been giving herself Neupogen daily at home for the past 3 days. She followed up 

with our local oncologist (Dr. Wellington) two days ago at which time she was 

asymptomatic and advised to come to the hospital if she developed a fever. In 

the ED, she is found to have pancytopenia with Absolute neutrophil count of 120.

Of note, she had a vaginal delivery on 12/31/19 and has been having 

small/moderate amount of vaginal bleeding since then. She denies any CP, nausea,

vomiting or diarrhea at this time. She has not had a BM in the past 4 days. 


.





HOSPITAL COURSE: Patient was admitted with possible febrile neutropenia, as per 

patient, she had fever at home, but she remained afebrile throughout her course 

in the hospital. Patient's WBC count slowly improved today is 4.2, afebrile, 

asymptomatic and she will be discharged home. She'll follow-up with her 

oncologist at [facility]. Patient was initially started on meropenem and the 

prophylactic antibiotic treatment which will be DC'd on discharge. Patient will 

continue all home meds and follow with oncology as per scheduled within a week. 





DISCHARGE MEDICATIONS: Please see below.


 


ALLERGIES: Please see below.





PHYSICAL EXAMINATION ON DISCHARGE:


VITAL SIGNS: Please see below.


GENERAL: Within normal limits


HEENT: PERRLA. Extraocular muscles intact


NECK: Supple


CARDIOVASCULAR EXAMINATION: S1, S2, regular


RESPIRATORY EXAMINATION: Clear to A&P


ABDOMINAL EXAMINATION: , Soft, nontender, bowel sounds  present


EXTREMITIES: No cyanosis, clubbing or edema


SKIN: Within normal limits


NEUROLOGICAL EXAMINATION: . No focal motor sensory deficit 


PSYCHIATRIC EXAMINATION: Normal





LABORATORY DATA: Please see below.





IMAGING: Hepatosplenomegaly.  Extensive bulky upper and lower abdominal 

lymphadenopathy.


There is a small quantity of high attenuation ascites suggesting hemoperitoneum.


There is a small to moderate right pleural effusion of lower density.  There is


widespread essentially diffuse skeletal metastatic disease





PROGNOSIS: Unknown





ACTIVITY: As tolerated.





DIET: Regular





DISCHARGE PLAN: Follow-up with the oncologist [facility]





DISPOSITION: 01 Home, Self-Care.





DISCHARGE INSTRUCTIONS:


1. As per discharge instructions.





ITEMS TO FOLLOWUP ON ON OUTPATIENT:


1. Follow-up with the oncologist in one week.





DISCHARGE CONDITION: Stable.





TIME SPENT ON DISCHARGE: 35 minutes.





Vital Signs/I&Os





Vital Signs








  Date Time  Temp Pulse Resp B/P (MAP) Pulse Ox O2 Delivery O2 Flow Rate FiO2


 


1/21/20 08:48   17   Room Air  


 


1/21/20 08:00 97.7 98  137/82 (100) 96   














I&O- Last 24 Hours up to 6 AM 


 


 1/21/20





 06:00


 


Intake Total 860 ml


 


Output Total 750 ml


 


Balance 110 ml











Laboratory Data


Labs 24H


Laboratory Tests 2


1/21/20 05:02: 


Nucleated Red Blood Cells % (auto) 1.4H, Immature Platelet Fraction 6.6, Anion 

Gap 6L, Calcium Level 8.3L, Magnesium Level 2.1


CBC/BMP


Laboratory Tests


1/21/20 05:02











Microbiology





Microbiology


1/18/20 Urine Culture - Final, Complete


          


1/18/20 Eye/Ear/Nose/Throat Culture - Final, Complete


          


1/18/20 Blood Culture - Preliminary, Resulted


          No Growth after 72 hours. All specime...


1/18/20 Blood Culture - Preliminary, Resulted


          No Growth after 72 hours. All specime...





Discharge Medications


Scheduled


Acetaminophen (Acetaminophen) 500 Mg Tablet, 1,000 MG PO TID, (Reported)


Docusate Sodium (Colace) 100 Mg Capsule, 100 MG PO BID, (Reported)


   2ND DOSE AT 1700 


Doxycycline Hyclate (Doxycycline Hyclate) 100 Mg Capsule, 100 MG PO BID, 

(Reported)


Escitalopram Oxalate (Lexapro) 5 Mg Tablet, 5 MG PO DAILY, (Reported)


Filgrastim-Sndz (Zarxio) 300 Mcg/0.5 Ml Syringe, 300 MCG SC DAILY, (Reported)


   INJECT INTO THE SKIN EVERY 24 HOURS FOR 7 DAYS STARTING 01/16/2020 


Gabapentin (Neurontin) 300 Mg Capsule, 300 MG PO QHS, (Reported)


Levothyroxine Sodium (Levo-T) 75 Mcg Tablet, 75 MCG PO DAILY, (Reported)


Nystatin (Nystatin Oral Susp) 100,000 Unit/1 Ml Oral.susp, 5 ML PO QID, 

(Reported)


   SWISH AND HOLD IN MOUTH BEFORE SWALLOWING 


Pantoprazole Sodium (Protonix) 40 Mg Granpkt.dr, 40 MG PO DAILY, (Reported)


Polyethylene Glycol 3350 (Miralax) 119 Gm Powder, 17 GRAM PO BID, (Reported)


   MIX INTO WATER AND JUICE 





Scheduled PRN


Diazepam (Diazepam) 2 Mg Tablet, 2 MG PO TID PRN for ANXIETY, (Reported)


Hydromorphone HCl (Dilaudid) 2 Mg Tablet, 2 MG PO Q1H PRN for pain, (Reported)


   UP TO 6 TABLETS PER DAY 


Morphine Sulfate (Morphine Sulfate ER) 100 Mg Tablet.er, 100 MG PO BID PRN for 

pain, (Reported)


Ondansetron HCl (Zofran) 8 Mg Tablet, 8 MG PO TID PRN for NAUSEA OR VOMITING, 

(Reported)


Prochlorperazine (Prochlorperazine Maleate) 5 Mg Tablet, 10 MG PO Q6H PRN for 

NAUSEA, (Reported)


Sennosides (Senna) 8.6 Mg Tablet, 17.2 MG PO BID PRN for CONSTIPATION, 

(Reported)





Allergies


Coded Allergies:  


     Penicillins (Verified  Allergy, Intermediate, hives, 1/18/20)











ANAY ART MD              Jan 21, 2020 15:48

## 2020-01-24 ENCOUNTER — HOSPITAL ENCOUNTER (OUTPATIENT)
Dept: HOSPITAL 53 - M ONCM | Age: 20
End: 2020-01-24
Payer: COMMERCIAL

## 2020-01-24 DIAGNOSIS — C49.9: Primary | ICD-10-CM

## 2020-01-24 LAB
ALBUMIN SERPL BCG-MCNC: 2.7 GM/DL (ref 3.2–5.2)
ALT SERPL W P-5'-P-CCNC: 11 U/L (ref 12–78)
BASOPHILS # BLD AUTO: 0.1 10^3/UL (ref 0–0.2)
BASOPHILS NFR BLD AUTO: 0.2 % (ref 0–1)
BILIRUB SERPL-MCNC: 0.3 MG/DL (ref 0.2–1)
BUN SERPL-MCNC: 8 MG/DL (ref 7–18)
CALCIUM SERPL-MCNC: 8.9 MG/DL (ref 8.5–10.1)
CHLORIDE SERPL-SCNC: 104 MEQ/L (ref 98–107)
CO2 SERPL-SCNC: 30 MEQ/L (ref 21–32)
CREAT SERPL-MCNC: 0.54 MG/DL (ref 0.55–1.3)
EOSINOPHIL # BLD AUTO: 0 10^3/UL (ref 0–0.5)
EOSINOPHIL NFR BLD AUTO: 0.1 % (ref 0–3)
GLUCOSE SERPL-MCNC: 90 MG/DL (ref 70–100)
HCT VFR BLD AUTO: 28.7 % (ref 36–47)
HGB BLD-MCNC: 8.9 G/DL (ref 12–15.5)
LYMPHOCYTES # BLD AUTO: 2.6 10^3/UL (ref 1.5–5)
LYMPHOCYTES NFR BLD AUTO: 8.2 % (ref 24–44)
MCH RBC QN AUTO: 27.9 PG (ref 27–33)
MCHC RBC AUTO-ENTMCNC: 31 G/DL (ref 32–36.5)
MCV RBC AUTO: 90 FL (ref 80–96)
MONOCYTES # BLD AUTO: 2.6 10^3/UL (ref 0–0.8)
MONOCYTES NFR BLD AUTO: 8.2 % (ref 0–5)
NEUTROPHILS # BLD AUTO: 18.4 10^3/UL (ref 1.5–8.5)
NEUTROPHILS NFR BLD AUTO: 58.2 % (ref 36–66)
PLATELET # BLD AUTO: 121 10^3/UL (ref 150–450)
POTASSIUM SERPL-SCNC: 3.7 MEQ/L (ref 3.5–5.1)
PROT SERPL-MCNC: 6.8 GM/DL (ref 6.4–8.2)
RBC # BLD AUTO: 3.19 10^6/UL (ref 4–5.4)
SODIUM SERPL-SCNC: 141 MEQ/L (ref 136–145)
WBC # BLD AUTO: 31.5 10^3/UL (ref 4–10)

## 2020-02-03 ENCOUNTER — HOSPITAL ENCOUNTER (OUTPATIENT)
Dept: HOSPITAL 53 - M LAB REF | Age: 20
End: 2020-02-03
Payer: COMMERCIAL

## 2020-02-03 DIAGNOSIS — C49.9: Primary | ICD-10-CM

## 2020-02-03 LAB
ALBUMIN SERPL BCG-MCNC: 3.6 GM/DL (ref 3.2–5.2)
ALT SERPL W P-5'-P-CCNC: 21 U/L (ref 12–78)
AMORPH SED URNS QL MICRO: (no result)
APPEARANCE UR: CLEAR
BACTERIA URNS QL MICRO: (no result)
BILIRUB SERPL-MCNC: 0.6 MG/DL (ref 0.2–1)
BILIRUB UR QL STRIP: NEGATIVE
BUN SERPL-MCNC: 14 MG/DL (ref 7–18)
CALCIUM SERPL-MCNC: 8.5 MG/DL (ref 8.5–10.1)
CHLORIDE SERPL-SCNC: 103 MEQ/L (ref 98–107)
CO2 SERPL-SCNC: 27 MEQ/L (ref 21–32)
COLOR UR: (no result)
CREAT SERPL-MCNC: 0.49 MG/DL (ref 0.55–1.3)
DACRYOCYTES BLD QL SMEAR: (no result)
GLUCOSE SERPL-MCNC: 75 MG/DL (ref 70–100)
GLUCOSE UR STRIP-MCNC: NEGATIVE MG/DL
HCT VFR BLD AUTO: 24.5 % (ref 36–47)
HGB BLD-MCNC: 7.6 G/DL (ref 12–15.5)
HGB UR QL STRIP: (no result)
HYALINE CASTS URNS QL MICRO: (no result) /LPF (ref 0–1)
KETONES UR QL STRIP: NEGATIVE MG/DL
LEUKOCYTE ESTERASE UR QL STRIP: (no result)
LYMPHOCYTES NFR BLD MANUAL: 6 % (ref 16–44)
MCH RBC QN AUTO: 27.4 PG (ref 27–33)
MCHC RBC AUTO-ENTMCNC: 31 G/DL (ref 32–36.5)
MCV RBC AUTO: 88.4 FL (ref 80–96)
MICROCYTES BLD QL SMEAR: (no result)
MONOCYTES NFR BLD MANUAL: 1 % (ref 0–5)
MUCOUS THREADS URNS QL MICRO: (no result)
NEUTROPHILS NFR BLD MANUAL: 90 % (ref 28–66)
NITRITE UR QL STRIP: (no result)
PH UR STRIP: 5 UNITS (ref 5–7)
PLATELET # BLD AUTO: 97 10^3/UL (ref 150–450)
PLATELET BLD QL SMEAR: (no result)
POTASSIUM SERPL-SCNC: 3.6 MEQ/L (ref 3.5–5.1)
PROT SERPL-MCNC: 6.8 GM/DL (ref 6.4–8.2)
PROT UR STRIP-MCNC: (no result) MG/DL
RBC # BLD AUTO: 2.77 10^6/UL (ref 4–5.4)
RBC #/AREA URNS HPF: (no result) /HPF (ref 0–3)
SODIUM SERPL-SCNC: 138 MEQ/L (ref 136–145)
SP GR UR STRIP: 1.02 (ref 1–1.03)
SQUAMOUS URNS QL MICRO: (no result) /HPF
UROBILINOGEN UR QL STRIP: NORMAL MG/DL
VARIANT LYMPHS NFR BLD MANUAL: 1 % (ref 0–5)
WBC # BLD AUTO: 12.9 10^3/UL (ref 4–10)
WBC #/AREA URNS HPF: (no result) /HPF (ref 0–3)

## 2020-02-05 ENCOUNTER — HOSPITAL ENCOUNTER (OUTPATIENT)
Dept: HOSPITAL 53 - M LAB REF | Age: 20
End: 2020-02-05
Payer: COMMERCIAL

## 2020-02-05 DIAGNOSIS — C49.9: Primary | ICD-10-CM

## 2020-02-05 LAB
ALBUMIN SERPL BCG-MCNC: 3.8 GM/DL (ref 3.2–5.2)
ALT SERPL W P-5'-P-CCNC: 22 U/L (ref 12–78)
BILIRUB SERPL-MCNC: 0.5 MG/DL (ref 0.2–1)
BUN SERPL-MCNC: 18 MG/DL (ref 7–18)
CALCIUM SERPL-MCNC: 8.6 MG/DL (ref 8.5–10.1)
CHLORIDE SERPL-SCNC: 105 MEQ/L (ref 98–107)
CO2 SERPL-SCNC: 28 MEQ/L (ref 21–32)
CREAT SERPL-MCNC: 0.48 MG/DL (ref 0.55–1.3)
DACRYOCYTES BLD QL SMEAR: (no result)
GLUCOSE SERPL-MCNC: 109 MG/DL (ref 70–100)
HCT VFR BLD AUTO: 23.9 % (ref 36–47)
HGB BLD-MCNC: 7.4 G/DL (ref 12–15.5)
HYPOCHROMIA BLD QL SMEAR: (no result)
LYMPHOCYTES NFR BLD MANUAL: 16 % (ref 16–44)
MCH RBC QN AUTO: 27.2 PG (ref 27–33)
MCHC RBC AUTO-ENTMCNC: 31 G/DL (ref 32–36.5)
MCV RBC AUTO: 87.9 FL (ref 80–96)
MONOCYTES NFR BLD MANUAL: 2 % (ref 0–5)
NEUTROPHILS NFR BLD MANUAL: 78 % (ref 28–66)
PLATELET # BLD AUTO: 73 10^3/UL (ref 150–450)
PLATELET BLD QL SMEAR: (no result)
POTASSIUM SERPL-SCNC: 3.9 MEQ/L (ref 3.5–5.1)
PROT SERPL-MCNC: 7.3 GM/DL (ref 6.4–8.2)
RBC # BLD AUTO: 2.72 10^6/UL (ref 4–5.4)
SODIUM SERPL-SCNC: 139 MEQ/L (ref 136–145)
WBC # BLD AUTO: 4.3 10^3/UL (ref 4–10)

## 2020-02-11 ENCOUNTER — HOSPITAL ENCOUNTER (EMERGENCY)
Dept: HOSPITAL 53 - M ED | Age: 20
LOS: 1 days | Discharge: HOME | End: 2020-02-12
Payer: COMMERCIAL

## 2020-02-11 VITALS — HEIGHT: 63 IN | BODY MASS INDEX: 20.77 KG/M2 | WEIGHT: 117.24 LBS

## 2020-02-11 DIAGNOSIS — C49.9: ICD-10-CM

## 2020-02-11 DIAGNOSIS — D69.6: ICD-10-CM

## 2020-02-11 DIAGNOSIS — R42: Primary | ICD-10-CM

## 2020-02-11 DIAGNOSIS — Z88.0: ICD-10-CM

## 2020-02-11 DIAGNOSIS — Z79.899: ICD-10-CM

## 2020-02-11 DIAGNOSIS — D64.9: ICD-10-CM

## 2020-02-11 DIAGNOSIS — Z79.2: ICD-10-CM

## 2020-02-11 DIAGNOSIS — E06.3: ICD-10-CM

## 2020-02-11 LAB
ALBUMIN SERPL BCG-MCNC: 3.6 GM/DL (ref 3.2–5.2)
ALT SERPL W P-5'-P-CCNC: 32 U/L (ref 12–78)
B-HCG SERPL QL: NEGATIVE
BILIRUB CONJ SERPL-MCNC: 0.1 MG/DL (ref 0–0.2)
BILIRUB SERPL-MCNC: 0.4 MG/DL (ref 0.2–1)
BLASTS NFR BLD MANUAL: 1 % (ref 0–0)
BUN SERPL-MCNC: 11 MG/DL (ref 7–18)
CALCIUM SERPL-MCNC: 9.1 MG/DL (ref 8.5–10.1)
CHLORIDE SERPL-SCNC: 102 MEQ/L (ref 98–107)
CK MB CFR.DF SERPL CALC: 5.56
CK MB SERPL-MCNC: < 1 NG/ML (ref ?–3.6)
CK SERPL-CCNC: 18 U/L (ref 26–192)
CO2 SERPL-SCNC: 32 MEQ/L (ref 21–32)
CREAT SERPL-MCNC: 0.66 MG/DL (ref 0.55–1.3)
GLUCOSE SERPL-MCNC: 89 MG/DL (ref 70–100)
HCT VFR BLD AUTO: 23.7 % (ref 36–47)
HGB BLD-MCNC: 7.3 G/DL (ref 12–15.5)
LYMPHOCYTES NFR BLD MANUAL: 6 % (ref 16–44)
MCH RBC QN AUTO: 27.3 PG (ref 27–33)
MCHC RBC AUTO-ENTMCNC: 30.8 G/DL (ref 32–36.5)
MCV RBC AUTO: 88.8 FL (ref 80–96)
METAMYELOCYTES NFR BLD MANUAL: 10 % (ref 0–0)
MONOCYTES NFR BLD MANUAL: 3 % (ref 0–5)
MYELOBLASTS NFR BLD MANUAL: 5 % (ref 0–0)
NEUTROPHILS NFR BLD MANUAL: 64 % (ref 28–66)
PLATELET # BLD AUTO: 60 10^3/UL (ref 150–450)
PLATELET BLD QL SMEAR: (no result)
POTASSIUM SERPL-SCNC: 3.9 MEQ/L (ref 3.5–5.1)
PROMYELOCYTES # BLD MANUAL: 10 % (ref 0–0)
PROT SERPL-MCNC: 6.2 GM/DL (ref 6.4–8.2)
RBC # BLD AUTO: 2.67 10^6/UL (ref 4–5.4)
SODIUM SERPL-SCNC: 141 MEQ/L (ref 136–145)
T4 FREE SERPL-MCNC: 1.51 NG/DL (ref 0.78–1.33)
TROPONIN I SERPL-MCNC: < 0.02 NG/ML (ref ?–0.1)
TSH SERPL DL<=0.005 MIU/L-ACNC: 1.47 UIU/ML (ref 0.46–3.98)
WBC # BLD AUTO: 46.9 10^3/UL (ref 4–10)

## 2020-02-12 VITALS — SYSTOLIC BLOOD PRESSURE: 114 MMHG | DIASTOLIC BLOOD PRESSURE: 61 MMHG

## 2020-02-12 LAB
ANISOCYTOSIS BLD QL SMEAR: (no result)
TOXIC GRANULES BLD QL SMEAR: (no result)

## 2020-02-12 NOTE — ECGEPIP
[facility] - ED

                                       

                                       Test Date:    2020

Pat Name:     PIERRE CAMACHO           Department:   

Patient ID:   [id number]                 Room:         -

Gender:       Female                   Technician:   STEPHANIE

:          2000               Requested By: RAJENDRA BERG

Order Number: [id number]     Reading MD:   America Love

                                 Measurements

Intervals                              Axis          

Rate:         108                      P:            55

MS:           144                      QRS:          -2

QRSD:         82                       T:            6

QT:           327                                    

QTc:          438                                    

                           Interpretive Statements

SINUS TACHYCARDIA

ABNORMAL RHYTHM ECG

NSTTW abnormalities

NO PRIOR

Electronically Signed on 2020 20:06:07 EST by America Love

## 2020-02-14 ENCOUNTER — HOSPITAL ENCOUNTER (OUTPATIENT)
Dept: HOSPITAL 53 - M LAB REF | Age: 20
End: 2020-02-14
Payer: COMMERCIAL

## 2020-02-14 ENCOUNTER — HOSPITAL ENCOUNTER (EMERGENCY)
Dept: HOSPITAL 53 - M ED | Age: 20
LOS: 1 days | Discharge: HOME | End: 2020-02-15
Payer: COMMERCIAL

## 2020-02-14 VITALS — HEIGHT: 63 IN | WEIGHT: 117.07 LBS | BODY MASS INDEX: 20.74 KG/M2

## 2020-02-14 DIAGNOSIS — Z79.2: ICD-10-CM

## 2020-02-14 DIAGNOSIS — K29.70: Primary | ICD-10-CM

## 2020-02-14 DIAGNOSIS — E06.3: ICD-10-CM

## 2020-02-14 DIAGNOSIS — Z79.899: ICD-10-CM

## 2020-02-14 DIAGNOSIS — C49.9: ICD-10-CM

## 2020-02-14 DIAGNOSIS — C49.9: Primary | ICD-10-CM

## 2020-02-14 DIAGNOSIS — Z88.0: ICD-10-CM

## 2020-02-14 LAB
ALBUMIN SERPL BCG-MCNC: 3.6 GM/DL (ref 3.2–5.2)
ALBUMIN SERPL BCG-MCNC: 3.8 GM/DL (ref 3.2–5.2)
ALT SERPL W P-5'-P-CCNC: 27 U/L (ref 12–78)
ALT SERPL W P-5'-P-CCNC: 29 U/L (ref 12–78)
AMORPH SED URNS QL MICRO: (no result)
ANISOCYTOSIS BLD QL SMEAR: (no result)
ANISOCYTOSIS BLD QL SMEAR: (no result)
APPEARANCE UR: (no result)
BACTERIA UR QL AUTO: NEGATIVE
BASO STIPL BLD QL SMEAR: (no result)
BASOPHILS NFR BLD MANUAL: 1 % (ref 0–1)
BILIRUB CONJ SERPL-MCNC: 0.2 MG/DL (ref 0–0.2)
BILIRUB SERPL-MCNC: 0.6 MG/DL (ref 0.2–1)
BILIRUB SERPL-MCNC: 0.7 MG/DL (ref 0.2–1)
BILIRUB UR QL STRIP.AUTO: NEGATIVE
BUN SERPL-MCNC: 14 MG/DL (ref 7–18)
CALCIUM SERPL-MCNC: 8.1 MG/DL (ref 8.5–10.1)
CHLORIDE SERPL-SCNC: 105 MEQ/L (ref 98–107)
CO2 SERPL-SCNC: 27 MEQ/L (ref 21–32)
CREAT SERPL-MCNC: 0.44 MG/DL (ref 0.55–1.3)
DACRYOCYTES BLD QL SMEAR: (no result)
GLUCOSE SERPL-MCNC: 91 MG/DL (ref 70–100)
GLUCOSE UR QL STRIP.AUTO: NEGATIVE MG/DL
HCT VFR BLD AUTO: 23.2 % (ref 36–47)
HCT VFR BLD AUTO: 23.7 % (ref 36–47)
HGB BLD-MCNC: 7.3 G/DL (ref 12–15.5)
HGB BLD-MCNC: 7.4 G/DL (ref 12–15.5)
HGB UR QL STRIP.AUTO: (no result)
HYPOCHROMIA BLD QL SMEAR: (no result)
KETONES UR QL STRIP.AUTO: NEGATIVE MG/DL
LEUKOCYTE ESTERASE UR QL STRIP.AUTO: NEGATIVE
LIPASE SERPL-CCNC: 241 U/L (ref 73–393)
LYMPHOCYTES NFR BLD MANUAL: 13 % (ref 16–44)
LYMPHOCYTES NFR BLD MANUAL: 15 % (ref 16–44)
MACROCYTES BLD QL SMEAR: (no result)
MCH RBC QN AUTO: 27.6 PG (ref 27–33)
MCH RBC QN AUTO: 28.1 PG (ref 27–33)
MCHC RBC AUTO-ENTMCNC: 31.2 G/DL (ref 32–36.5)
MCHC RBC AUTO-ENTMCNC: 31.5 G/DL (ref 32–36.5)
MCV RBC AUTO: 88.4 FL (ref 80–96)
MCV RBC AUTO: 89.2 FL (ref 80–96)
METAMYELOCYTES NFR BLD MANUAL: 3 % (ref 0–0)
METAMYELOCYTES NFR BLD MANUAL: 8 % (ref 0–0)
MICROCYTES BLD QL SMEAR: (no result)
MONOCYTES NFR BLD MANUAL: 2 % (ref 0–5)
MONOCYTES NFR BLD MANUAL: 8 % (ref 0–5)
MUCOUS THREADS URNS QL MICRO: (no result)
MYELOBLASTS NFR BLD MANUAL: 11 % (ref 0–0)
MYELOBLASTS NFR BLD MANUAL: 4 % (ref 0–0)
NEUTROPHILS NFR BLD MANUAL: 45 % (ref 28–66)
NEUTROPHILS NFR BLD MANUAL: 48 % (ref 28–66)
NITRITE UR QL STRIP.AUTO: NEGATIVE
PH UR STRIP.AUTO: 5 UNITS (ref 5–9)
PLATELET # BLD AUTO: 59 10^3/UL (ref 150–450)
PLATELET # BLD AUTO: 62 10^3/UL (ref 150–450)
PLATELET BLD QL SMEAR: (no result)
PLATELET BLD QL SMEAR: (no result)
POLYCHROMASIA BLD QL SMEAR: (no result)
POLYCHROMASIA BLD QL SMEAR: (no result)
POTASSIUM SERPL-SCNC: 3.9 MEQ/L (ref 3.5–5.1)
PROMYELOCYTES # BLD MANUAL: 1 % (ref 0–0)
PROT SERPL-MCNC: 6.8 GM/DL (ref 6.4–8.2)
PROT SERPL-MCNC: 7 GM/DL (ref 6.4–8.2)
PROT UR QL STRIP.AUTO: (no result) MG/DL
RBC # BLD AUTO: 2.6 10^6/UL (ref 4–5.4)
RBC # BLD AUTO: 2.68 10^6/UL (ref 4–5.4)
RBC # UR AUTO: 0 /HPF (ref 0–3)
SODIUM SERPL-SCNC: 139 MEQ/L (ref 136–145)
SP GR UR STRIP.AUTO: 1.03 (ref 1–1.03)
SQUAMOUS #/AREA URNS AUTO: 2 /HPF (ref 0–6)
TOXIC GRANULES BLD QL SMEAR: (no result)
TOXIC GRANULES BLD QL SMEAR: (no result)
UROBILINOGEN UR QL STRIP.AUTO: 0.2 MG/DL (ref 0–2)
VARIANT LYMPHS NFR BLD MANUAL: 1 % (ref 0–5)
VARIANT LYMPHS NFR BLD MANUAL: 4 % (ref 0–5)
WBC # BLD AUTO: 32.3 10^3/UL (ref 4–10)
WBC # BLD AUTO: 36 10^3/UL (ref 4–10)
WBC #/AREA URNS AUTO: 2 /HPF (ref 0–3)

## 2020-02-15 VITALS — DIASTOLIC BLOOD PRESSURE: 58 MMHG | SYSTOLIC BLOOD PRESSURE: 110 MMHG

## 2020-02-24 ENCOUNTER — HOSPITAL ENCOUNTER (OUTPATIENT)
Dept: HOSPITAL 53 - M LAB REF | Age: 20
End: 2020-02-24
Payer: COMMERCIAL

## 2020-02-24 DIAGNOSIS — C49.9: Primary | ICD-10-CM

## 2020-02-24 LAB
ALBUMIN SERPL BCG-MCNC: 4 GM/DL (ref 3.2–5.2)
ALT SERPL W P-5'-P-CCNC: 16 U/L (ref 12–78)
ANISOCYTOSIS BLD QL SMEAR: (no result)
APPEARANCE UR: CLEAR
BACTERIA UR QL AUTO: NEGATIVE
BILIRUB SERPL-MCNC: 0.8 MG/DL (ref 0.2–1)
BILIRUB UR QL STRIP.AUTO: NEGATIVE
BUN SERPL-MCNC: 26 MG/DL (ref 7–18)
CALCIUM SERPL-MCNC: 9.2 MG/DL (ref 8.5–10.1)
CHLORIDE SERPL-SCNC: 104 MEQ/L (ref 98–107)
CO2 SERPL-SCNC: 28 MEQ/L (ref 21–32)
CREAT SERPL-MCNC: 0.46 MG/DL (ref 0.55–1.3)
DACRYOCYTES BLD QL SMEAR: (no result)
GLUCOSE SERPL-MCNC: 81 MG/DL (ref 70–100)
GLUCOSE UR QL STRIP.AUTO: NEGATIVE MG/DL
HCT VFR BLD AUTO: 25.6 % (ref 36–47)
HGB BLD-MCNC: 8.5 G/DL (ref 12–15.5)
HGB UR QL STRIP.AUTO: NEGATIVE
KETONES UR QL STRIP.AUTO: NEGATIVE MG/DL
LEUKOCYTE ESTERASE UR QL STRIP.AUTO: NEGATIVE
LYMPHOCYTES NFR BLD MANUAL: 12 % (ref 16–44)
MCH RBC QN AUTO: 28.8 PG (ref 27–33)
MCHC RBC AUTO-ENTMCNC: 33.2 G/DL (ref 32–36.5)
MCV RBC AUTO: 86.8 FL (ref 80–96)
MONOCYTES NFR BLD MANUAL: 1 % (ref 0–5)
MUCOUS THREADS URNS QL MICRO: (no result)
NEUTROPHILS NFR BLD MANUAL: 86 % (ref 28–66)
NITRITE UR QL STRIP.AUTO: NEGATIVE
PH UR STRIP.AUTO: 7 UNITS (ref 5–9)
PLATELET # BLD AUTO: 94 10^3/UL (ref 150–450)
PLATELET BLD QL SMEAR: (no result)
POTASSIUM SERPL-SCNC: 3.5 MEQ/L (ref 3.5–5.1)
PROT SERPL-MCNC: 7 GM/DL (ref 6.4–8.2)
PROT UR QL STRIP.AUTO: NEGATIVE MG/DL
RBC # BLD AUTO: 2.95 10^6/UL (ref 4–5.4)
RBC # UR AUTO: 1 /HPF (ref 0–3)
SCHISTOCYTES BLD QL SMEAR: (no result)
SODIUM SERPL-SCNC: 138 MEQ/L (ref 136–145)
SP GR UR STRIP.AUTO: 1.03 (ref 1–1.03)
SQUAMOUS #/AREA URNS AUTO: 0 /HPF (ref 0–6)
UROBILINOGEN UR QL STRIP.AUTO: 0.2 MG/DL (ref 0–2)
VARIANT LYMPHS NFR BLD MANUAL: 1 % (ref 0–5)
WBC # BLD AUTO: 3.7 10^3/UL (ref 4–10)
WBC #/AREA URNS AUTO: 2 /HPF (ref 0–3)

## 2020-03-02 ENCOUNTER — HOSPITAL ENCOUNTER (OUTPATIENT)
Dept: HOSPITAL 53 - M LAB REF | Age: 20
End: 2020-03-02
Attending: INTERNAL MEDICINE
Payer: COMMERCIAL

## 2020-03-02 DIAGNOSIS — C49.9: Primary | ICD-10-CM

## 2020-03-02 LAB
ALBUMIN SERPL BCG-MCNC: 4.1 GM/DL (ref 3.2–5.2)
ALT SERPL W P-5'-P-CCNC: 23 U/L (ref 12–78)
ANISOCYTOSIS BLD QL SMEAR: (no result)
BASO STIPL BLD QL SMEAR: (no result)
BILIRUB SERPL-MCNC: 0.6 MG/DL (ref 0.2–1)
BUN SERPL-MCNC: 14 MG/DL (ref 7–18)
CALCIUM SERPL-MCNC: 9.3 MG/DL (ref 8.5–10.1)
CHLORIDE SERPL-SCNC: 107 MEQ/L (ref 98–107)
CO2 SERPL-SCNC: 28 MEQ/L (ref 21–32)
CREAT SERPL-MCNC: 0.48 MG/DL (ref 0.55–1.3)
GLUCOSE SERPL-MCNC: 89 MG/DL (ref 70–100)
HCT VFR BLD AUTO: 25 % (ref 36–47)
HGB BLD-MCNC: 8.1 G/DL (ref 12–15.5)
LYMPHOCYTES NFR BLD MANUAL: 12 % (ref 16–44)
MCH RBC QN AUTO: 28.9 PG (ref 27–33)
MCHC RBC AUTO-ENTMCNC: 32.4 G/DL (ref 32–36.5)
MCV RBC AUTO: 89.3 FL (ref 80–96)
METAMYELOCYTES NFR BLD MANUAL: 3 % (ref 0–0)
MONOCYTES NFR BLD MANUAL: 2 % (ref 0–5)
MYELOBLASTS NFR BLD MANUAL: 6 % (ref 0–0)
NEUTROPHILS NFR BLD MANUAL: 63 % (ref 28–66)
PLATELET # BLD AUTO: 173 10^3/UL (ref 150–450)
PLATELET BLD QL SMEAR: NORMAL
POLYCHROMASIA BLD QL SMEAR: (no result)
POTASSIUM SERPL-SCNC: 3.7 MEQ/L (ref 3.5–5.1)
PROT SERPL-MCNC: 7.4 GM/DL (ref 6.4–8.2)
RBC # BLD AUTO: 2.8 10^6/UL (ref 4–5.4)
SODIUM SERPL-SCNC: 139 MEQ/L (ref 136–145)
VARIANT LYMPHS NFR BLD MANUAL: 2 % (ref 0–5)
WBC # BLD AUTO: 26.3 10^3/UL (ref 4–10)

## 2020-03-06 ENCOUNTER — HOSPITAL ENCOUNTER (OUTPATIENT)
Dept: HOSPITAL 53 - M LAB REF | Age: 20
End: 2020-03-06
Attending: INTERNAL MEDICINE
Payer: COMMERCIAL

## 2020-03-06 DIAGNOSIS — C49.9: Primary | ICD-10-CM

## 2020-03-06 LAB
ALBUMIN SERPL BCG-MCNC: 4.1 GM/DL (ref 3.2–5.2)
ALT SERPL W P-5'-P-CCNC: 24 U/L (ref 12–78)
ANISOCYTOSIS BLD QL SMEAR: (no result)
BILIRUB SERPL-MCNC: 1.2 MG/DL (ref 0.2–1)
BUN SERPL-MCNC: 17 MG/DL (ref 7–18)
CALCIUM SERPL-MCNC: 9 MG/DL (ref 8.5–10.1)
CHLORIDE SERPL-SCNC: 109 MEQ/L (ref 98–107)
CO2 SERPL-SCNC: 27 MEQ/L (ref 21–32)
CREAT SERPL-MCNC: 0.52 MG/DL (ref 0.55–1.3)
GLUCOSE SERPL-MCNC: 103 MG/DL (ref 70–100)
HCT VFR BLD AUTO: 24.7 % (ref 36–47)
HGB BLD-MCNC: 7.8 G/DL (ref 12–15.5)
LYMPHOCYTES NFR BLD MANUAL: 10 % (ref 16–44)
MCH RBC QN AUTO: 28.6 PG (ref 27–33)
MCHC RBC AUTO-ENTMCNC: 31.6 G/DL (ref 32–36.5)
MCV RBC AUTO: 90.5 FL (ref 80–96)
METAMYELOCYTES NFR BLD MANUAL: 6 % (ref 0–0)
MONOCYTES NFR BLD MANUAL: 3 % (ref 0–5)
MYELOBLASTS NFR BLD MANUAL: 4 % (ref 0–0)
NEUTROPHILS NFR BLD MANUAL: 57 % (ref 28–66)
PLATELET # BLD AUTO: 246 10^3/UL (ref 150–450)
PLATELET BLD QL SMEAR: NORMAL
POTASSIUM SERPL-SCNC: 3.5 MEQ/L (ref 3.5–5.1)
PROT SERPL-MCNC: 6.9 GM/DL (ref 6.4–8.2)
RBC # BLD AUTO: 2.73 10^6/UL (ref 4–5.4)
SODIUM SERPL-SCNC: 140 MEQ/L (ref 136–145)
WBC # BLD AUTO: 12.7 10^3/UL (ref 4–10)

## 2020-03-23 ENCOUNTER — HOSPITAL ENCOUNTER (OUTPATIENT)
Dept: HOSPITAL 53 - M LAB REF | Age: 20
End: 2020-03-23
Attending: INTERNAL MEDICINE
Payer: COMMERCIAL

## 2020-03-23 DIAGNOSIS — C49.9: Primary | ICD-10-CM

## 2020-03-23 LAB
ALBUMIN SERPL BCG-MCNC: 3.8 GM/DL (ref 3.2–5.2)
ALT SERPL W P-5'-P-CCNC: 30 U/L (ref 12–78)
ANISOCYTOSIS BLD QL SMEAR: (no result)
APPEARANCE UR: CLEAR
BACTERIA UR QL AUTO: NEGATIVE
BILIRUB SERPL-MCNC: 0.8 MG/DL (ref 0.2–1)
BILIRUB UR QL STRIP.AUTO: NEGATIVE
BUN SERPL-MCNC: 17 MG/DL (ref 7–18)
CALCIUM SERPL-MCNC: 8.5 MG/DL (ref 8.5–10.1)
CHLORIDE SERPL-SCNC: 106 MEQ/L (ref 98–107)
CO2 SERPL-SCNC: 31 MEQ/L (ref 21–32)
CREAT SERPL-MCNC: 1.05 MG/DL (ref 0.55–1.3)
DACRYOCYTES BLD QL SMEAR: (no result)
GLUCOSE SERPL-MCNC: 72 MG/DL (ref 70–100)
GLUCOSE UR QL STRIP.AUTO: NEGATIVE MG/DL
HCT VFR BLD AUTO: 27.6 % (ref 36–47)
HGB BLD-MCNC: 8.7 G/DL (ref 12–15.5)
HGB UR QL STRIP.AUTO: NEGATIVE
KETONES UR QL STRIP.AUTO: NEGATIVE MG/DL
LEUKOCYTE ESTERASE UR QL STRIP.AUTO: NEGATIVE
LYMPHOCYTES NFR BLD MANUAL: 5 % (ref 16–44)
MCH RBC QN AUTO: 28.9 PG (ref 27–33)
MCHC RBC AUTO-ENTMCNC: 31.5 G/DL (ref 32–36.5)
MCV RBC AUTO: 91.7 FL (ref 80–96)
METAMYELOCYTES NFR BLD MANUAL: 11 % (ref 0–0)
MONOCYTES NFR BLD MANUAL: 1 % (ref 0–5)
MUCOUS THREADS URNS QL MICRO: (no result)
MYELOBLASTS NFR BLD MANUAL: 11 % (ref 0–0)
NEUTROPHILS NFR BLD MANUAL: 35 % (ref 28–66)
NITRITE UR QL STRIP.AUTO: NEGATIVE
PH UR STRIP.AUTO: 7 UNITS (ref 5–9)
PLATELET # BLD AUTO: 132 10^3/UL (ref 150–450)
PLATELET BLD QL SMEAR: NORMAL
POLYCHROMASIA BLD QL SMEAR: (no result)
POTASSIUM SERPL-SCNC: 3.4 MEQ/L (ref 3.5–5.1)
PROMYELOCYTES # BLD MANUAL: 4 % (ref 0–0)
PROT SERPL-MCNC: 6.7 GM/DL (ref 6.4–8.2)
PROT UR QL STRIP.AUTO: NEGATIVE MG/DL
RBC # BLD AUTO: 3.01 10^6/UL (ref 4–5.4)
RBC # UR AUTO: 1 /HPF (ref 0–3)
SODIUM SERPL-SCNC: 141 MEQ/L (ref 136–145)
SP GR UR STRIP.AUTO: 1.02 (ref 1–1.03)
SQUAMOUS #/AREA URNS AUTO: 0 /HPF (ref 0–6)
UROBILINOGEN UR QL STRIP.AUTO: 0.2 MG/DL (ref 0–2)
WBC # BLD AUTO: 44.4 10^3/UL (ref 4–10)
WBC #/AREA URNS AUTO: 1 /HPF (ref 0–3)

## 2020-03-27 ENCOUNTER — HOSPITAL ENCOUNTER (OUTPATIENT)
Dept: HOSPITAL 53 - M LAB REF | Age: 20
End: 2020-03-27
Attending: INTERNAL MEDICINE
Payer: COMMERCIAL

## 2020-03-27 DIAGNOSIS — C49.9: Primary | ICD-10-CM

## 2020-03-27 LAB
ALBUMIN SERPL BCG-MCNC: 4 GM/DL (ref 3.2–5.2)
ALT SERPL W P-5'-P-CCNC: 35 U/L (ref 12–78)
AMORPH SED URNS QL MICRO: (no result)
ANISOCYTOSIS BLD QL SMEAR: (no result)
APPEARANCE UR: (no result)
BACTERIA UR QL AUTO: (no result)
BILIRUB SERPL-MCNC: 0.5 MG/DL (ref 0.2–1)
BILIRUB UR QL STRIP.AUTO: NEGATIVE
BUN SERPL-MCNC: 21 MG/DL (ref 7–18)
CALCIUM SERPL-MCNC: 9.1 MG/DL (ref 8.5–10.1)
CHLORIDE SERPL-SCNC: 106 MEQ/L (ref 98–107)
CO2 SERPL-SCNC: 27 MEQ/L (ref 21–32)
CREAT SERPL-MCNC: 0.5 MG/DL (ref 0.55–1.3)
GLUCOSE SERPL-MCNC: 94 MG/DL (ref 70–100)
GLUCOSE UR QL STRIP.AUTO: NEGATIVE MG/DL
HCT VFR BLD AUTO: 28.9 % (ref 36–47)
HGB BLD-MCNC: 9.2 G/DL (ref 12–15.5)
HGB UR QL STRIP.AUTO: NEGATIVE
KETONES UR QL STRIP.AUTO: NEGATIVE MG/DL
LEUKOCYTE ESTERASE UR QL STRIP.AUTO: NEGATIVE
LYMPHOCYTES NFR BLD MANUAL: 6 % (ref 16–44)
MCH RBC QN AUTO: 29.5 PG (ref 27–33)
MCHC RBC AUTO-ENTMCNC: 31.8 G/DL (ref 32–36.5)
MCV RBC AUTO: 92.6 FL (ref 80–96)
METAMYELOCYTES NFR BLD MANUAL: 11 % (ref 0–0)
MONOCYTES NFR BLD MANUAL: 2 % (ref 0–5)
MUCOUS THREADS URNS QL MICRO: (no result)
MYELOBLASTS NFR BLD MANUAL: 6 % (ref 0–0)
NEUTROPHILS NFR BLD MANUAL: 59 % (ref 28–66)
NITRITE UR QL STRIP.AUTO: NEGATIVE
PH UR STRIP.AUTO: 5 UNITS (ref 5–9)
PLATELET # BLD AUTO: 105 10^3/UL (ref 150–450)
PLATELET BLD QL SMEAR: (no result)
POTASSIUM SERPL-SCNC: 3.7 MEQ/L (ref 3.5–5.1)
PROMYELOCYTES # BLD MANUAL: 2 % (ref 0–0)
PROT SERPL-MCNC: 7.4 GM/DL (ref 6.4–8.2)
PROT UR QL STRIP.AUTO: NEGATIVE MG/DL
RBC # BLD AUTO: 3.12 10^6/UL (ref 4–5.4)
RBC # UR AUTO: 0 /HPF (ref 0–3)
SODIUM SERPL-SCNC: 140 MEQ/L (ref 136–145)
SP GR UR STRIP.AUTO: 1.03 (ref 1–1.03)
SQUAMOUS #/AREA URNS AUTO: 0 /HPF (ref 0–6)
UROBILINOGEN UR QL STRIP.AUTO: 0.2 MG/DL (ref 0–2)
WBC # BLD AUTO: 44.6 10^3/UL (ref 4–10)
WBC #/AREA URNS AUTO: 2 /HPF (ref 0–3)

## 2020-04-03 ENCOUNTER — HOSPITAL ENCOUNTER (OUTPATIENT)
Dept: HOSPITAL 53 - M LAB REF | Age: 20
End: 2020-04-03
Attending: INTERNAL MEDICINE
Payer: COMMERCIAL

## 2020-04-03 DIAGNOSIS — C49.9: Primary | ICD-10-CM

## 2020-04-03 LAB
ALBUMIN SERPL BCG-MCNC: 3.7 GM/DL (ref 3.2–5.2)
ALT SERPL W P-5'-P-CCNC: 24 U/L (ref 12–78)
ANISOCYTOSIS BLD QL SMEAR: (no result)
BILIRUB SERPL-MCNC: 1 MG/DL (ref 0.2–1)
BUN SERPL-MCNC: 18 MG/DL (ref 7–18)
CALCIUM SERPL-MCNC: 8.2 MG/DL (ref 8.5–10.1)
CHLORIDE SERPL-SCNC: 109 MEQ/L (ref 98–107)
CO2 SERPL-SCNC: 27 MEQ/L (ref 21–32)
CREAT SERPL-MCNC: 0.5 MG/DL (ref 0.55–1.3)
GLUCOSE SERPL-MCNC: 138 MG/DL (ref 70–100)
HCT VFR BLD AUTO: 24.2 % (ref 36–47)
HGB BLD-MCNC: 7.9 G/DL (ref 12–15.5)
MCH RBC QN AUTO: 30.3 PG (ref 27–33)
MCHC RBC AUTO-ENTMCNC: 32.6 G/DL (ref 32–36.5)
MCV RBC AUTO: 92.7 FL (ref 80–96)
NEUTROPHILS NFR BLD MANUAL: 71 % (ref 28–66)
PLATELET # BLD AUTO: 151 10^3/UL (ref 150–450)
PLATELET BLD QL SMEAR: NORMAL
POTASSIUM SERPL-SCNC: 3.2 MEQ/L (ref 3.5–5.1)
PROT SERPL-MCNC: 6.9 GM/DL (ref 6.4–8.2)
RBC # BLD AUTO: 2.61 10^6/UL (ref 4–5.4)
SODIUM SERPL-SCNC: 140 MEQ/L (ref 136–145)
WBC # BLD AUTO: 66 10^3/UL (ref 4–10)

## 2020-04-06 ENCOUNTER — HOSPITAL ENCOUNTER (OUTPATIENT)
Dept: HOSPITAL 53 - M LAB REF | Age: 20
End: 2020-04-06
Attending: INTERNAL MEDICINE
Payer: COMMERCIAL

## 2020-04-06 DIAGNOSIS — C49.9: Primary | ICD-10-CM

## 2020-04-06 LAB
ALBUMIN SERPL BCG-MCNC: 3.5 GM/DL (ref 3.2–5.2)
ALT SERPL W P-5'-P-CCNC: 31 U/L (ref 12–78)
BASOPHILS NFR BLD MANUAL: 1 % (ref 0–1)
BILIRUB SERPL-MCNC: 1.1 MG/DL (ref 0.2–1)
BUN SERPL-MCNC: 22 MG/DL (ref 7–18)
CALCIUM SERPL-MCNC: 8.5 MG/DL (ref 8.5–10.1)
CHLORIDE SERPL-SCNC: 106 MEQ/L (ref 98–107)
CO2 SERPL-SCNC: 31 MEQ/L (ref 21–32)
CREAT SERPL-MCNC: 0.53 MG/DL (ref 0.55–1.3)
GLUCOSE SERPL-MCNC: 78 MG/DL (ref 70–100)
HCT VFR BLD AUTO: 21.9 % (ref 36–47)
HGB BLD-MCNC: 6.9 G/DL (ref 12–15.5)
HYPOCHROMIA BLD QL SMEAR: (no result)
LYMPHOCYTES NFR BLD MANUAL: 41 % (ref 16–44)
MCH RBC QN AUTO: 28.8 PG (ref 27–33)
MCHC RBC AUTO-ENTMCNC: 31.5 G/DL (ref 32–36.5)
MCV RBC AUTO: 91.3 FL (ref 80–96)
NEUTROPHILS NFR BLD MANUAL: 56 % (ref 28–66)
PLATELET # BLD AUTO: 87 10^3/UL (ref 150–450)
PLATELET BLD QL SMEAR: (no result)
POTASSIUM SERPL-SCNC: 4 MEQ/L (ref 3.5–5.1)
PROT SERPL-MCNC: 6.3 GM/DL (ref 6.4–8.2)
RBC # BLD AUTO: 2.4 10^6/UL (ref 4–5.4)
SODIUM SERPL-SCNC: 139 MEQ/L (ref 136–145)
WBC # BLD AUTO: 1.1 10^3/UL (ref 4–10)

## 2020-04-10 ENCOUNTER — HOSPITAL ENCOUNTER (OUTPATIENT)
Dept: HOSPITAL 53 - M LAB REF | Age: 20
End: 2020-04-10
Attending: INTERNAL MEDICINE
Payer: COMMERCIAL

## 2020-04-10 DIAGNOSIS — C49.9: Primary | ICD-10-CM

## 2020-04-10 LAB
APPEARANCE UR: CLEAR
BACTERIA UR QL AUTO: NEGATIVE
BILIRUB UR QL STRIP.AUTO: NEGATIVE
GLUCOSE UR QL STRIP.AUTO: NEGATIVE MG/DL
HGB UR QL STRIP.AUTO: NEGATIVE
KETONES UR QL STRIP.AUTO: NEGATIVE MG/DL
LEUKOCYTE ESTERASE UR QL STRIP.AUTO: NEGATIVE
MUCOUS THREADS URNS QL MICRO: (no result)
NITRITE UR QL STRIP.AUTO: NEGATIVE
PH UR STRIP.AUTO: 6 UNITS (ref 5–9)
PROT UR QL STRIP.AUTO: NEGATIVE MG/DL
RBC # UR AUTO: 1 /HPF (ref 0–3)
SP GR UR STRIP.AUTO: 1.02 (ref 1–1.03)
SQUAMOUS #/AREA URNS AUTO: 1 /HPF (ref 0–6)
UROBILINOGEN UR QL STRIP.AUTO: 0.2 MG/DL (ref 0–2)
WBC #/AREA URNS AUTO: 1 /HPF (ref 0–3)

## 2020-04-13 ENCOUNTER — HOSPITAL ENCOUNTER (OUTPATIENT)
Dept: HOSPITAL 53 - M LAB REF | Age: 20
End: 2020-04-13
Attending: INTERNAL MEDICINE
Payer: COMMERCIAL

## 2020-04-13 DIAGNOSIS — C49.9: Primary | ICD-10-CM

## 2020-04-13 LAB
ALBUMIN SERPL BCG-MCNC: 3.7 GM/DL (ref 3.2–5.2)
ALT SERPL W P-5'-P-CCNC: 23 U/L (ref 12–78)
ANISOCYTOSIS BLD QL SMEAR: (no result)
APPEARANCE UR: (no result)
BACTERIA UR QL AUTO: NEGATIVE
BILIRUB SERPL-MCNC: 0.6 MG/DL (ref 0.2–1)
BILIRUB UR QL STRIP.AUTO: NEGATIVE
BUN SERPL-MCNC: 9 MG/DL (ref 7–18)
CALCIUM SERPL-MCNC: 9.3 MG/DL (ref 8.5–10.1)
CHLORIDE SERPL-SCNC: 110 MEQ/L (ref 98–107)
CO2 SERPL-SCNC: 26 MEQ/L (ref 21–32)
CREAT SERPL-MCNC: 0.6 MG/DL (ref 0.55–1.3)
EOSINOPHIL NFR BLD MANUAL: 3 % (ref 0–3)
GLUCOSE SERPL-MCNC: 86 MG/DL (ref 70–100)
GLUCOSE UR QL STRIP.AUTO: NEGATIVE MG/DL
HCT VFR BLD AUTO: 25 % (ref 36–47)
HGB BLD-MCNC: 8.1 G/DL (ref 12–15.5)
HGB UR QL STRIP.AUTO: NEGATIVE
KETONES UR QL STRIP.AUTO: NEGATIVE MG/DL
LEUKOCYTE ESTERASE UR QL STRIP.AUTO: NEGATIVE
LYMPHOCYTES NFR BLD MANUAL: 9 % (ref 16–44)
MCH RBC QN AUTO: 30 PG (ref 27–33)
MCHC RBC AUTO-ENTMCNC: 32.4 G/DL (ref 32–36.5)
MCV RBC AUTO: 92.6 FL (ref 80–96)
METAMYELOCYTES NFR BLD MANUAL: 4 % (ref 0–0)
MONOCYTES NFR BLD MANUAL: 5 % (ref 0–5)
MUCOUS THREADS URNS QL MICRO: (no result)
MYELOBLASTS NFR BLD MANUAL: 5 % (ref 0–0)
NEUTROPHILS NFR BLD MANUAL: 61 % (ref 28–66)
NITRITE UR QL STRIP.AUTO: NEGATIVE
PH UR STRIP.AUTO: 5 UNITS (ref 5–9)
PLATELET # BLD AUTO: 210 10^3/UL (ref 150–450)
PLATELET BLD QL SMEAR: NORMAL
POIKILOCYTOSIS BLD QL SMEAR: (no result)
POLYCHROMASIA BLD QL SMEAR: (no result)
POTASSIUM SERPL-SCNC: 3.5 MEQ/L (ref 3.5–5.1)
PROMYELOCYTES # BLD MANUAL: 2 % (ref 0–0)
PROT SERPL-MCNC: 7 GM/DL (ref 6.4–8.2)
PROT UR QL STRIP.AUTO: NEGATIVE MG/DL
RBC # BLD AUTO: 2.7 10^6/UL (ref 4–5.4)
RBC # UR AUTO: 1 /HPF (ref 0–3)
SODIUM SERPL-SCNC: 142 MEQ/L (ref 136–145)
SP GR UR STRIP.AUTO: 1.02 (ref 1–1.03)
SQUAMOUS #/AREA URNS AUTO: 1 /HPF (ref 0–6)
UROBILINOGEN UR QL STRIP.AUTO: 0.2 MG/DL (ref 0–2)
VARIANT LYMPHS NFR BLD MANUAL: 3 % (ref 0–5)
WBC # BLD AUTO: 5.6 10^3/UL (ref 4–10)
WBC #/AREA URNS AUTO: 5 /HPF (ref 0–3)

## 2020-04-17 ENCOUNTER — HOSPITAL ENCOUNTER (OUTPATIENT)
Dept: HOSPITAL 53 - M LAB REF | Age: 20
End: 2020-04-17
Attending: INTERNAL MEDICINE
Payer: COMMERCIAL

## 2020-04-17 DIAGNOSIS — C49.9: Primary | ICD-10-CM

## 2020-04-17 LAB
ALBUMIN SERPL BCG-MCNC: 3.9 GM/DL (ref 3.2–5.2)
ALT SERPL W P-5'-P-CCNC: 15 U/L (ref 12–78)
BASOPHILS # BLD AUTO: 0 10^3/UL (ref 0–0.2)
BASOPHILS NFR BLD AUTO: 0.3 % (ref 0–1)
BILIRUB SERPL-MCNC: 0.7 MG/DL (ref 0.2–1)
BUN SERPL-MCNC: 10 MG/DL (ref 7–18)
CALCIUM SERPL-MCNC: 8.7 MG/DL (ref 8.5–10.1)
CHLORIDE SERPL-SCNC: 111 MEQ/L (ref 98–107)
CO2 SERPL-SCNC: 24 MEQ/L (ref 21–32)
CREAT SERPL-MCNC: 0.47 MG/DL (ref 0.55–1.3)
EOSINOPHIL # BLD AUTO: 0 10^3/UL (ref 0–0.5)
EOSINOPHIL NFR BLD AUTO: 0.5 % (ref 0–3)
GLUCOSE SERPL-MCNC: 83 MG/DL (ref 70–100)
HCT VFR BLD AUTO: 27.6 % (ref 36–47)
HGB BLD-MCNC: 8.8 G/DL (ref 12–15.5)
LYMPHOCYTES # BLD AUTO: 0.6 10^3/UL (ref 1.5–5)
LYMPHOCYTES NFR BLD AUTO: 16.8 % (ref 24–44)
MCH RBC QN AUTO: 30 PG (ref 27–33)
MCHC RBC AUTO-ENTMCNC: 31.9 G/DL (ref 32–36.5)
MCV RBC AUTO: 94.2 FL (ref 80–96)
MONOCYTES # BLD AUTO: 0.5 10^3/UL (ref 0–0.8)
MONOCYTES NFR BLD AUTO: 13.1 % (ref 0–5)
NEUTROPHILS # BLD AUTO: 2.6 10^3/UL (ref 1.5–8.5)
NEUTROPHILS NFR BLD AUTO: 67.2 % (ref 36–66)
PLATELET # BLD AUTO: 391 10^3/UL (ref 150–450)
POTASSIUM SERPL-SCNC: 3.9 MEQ/L (ref 3.5–5.1)
PROT SERPL-MCNC: 7.1 GM/DL (ref 6.4–8.2)
RBC # BLD AUTO: 2.93 10^6/UL (ref 4–5.4)
SODIUM SERPL-SCNC: 140 MEQ/L (ref 136–145)
WBC # BLD AUTO: 3.8 10^3/UL (ref 4–10)

## 2020-04-20 ENCOUNTER — HOSPITAL ENCOUNTER (OUTPATIENT)
Dept: HOSPITAL 53 - M LAB REF | Age: 20
End: 2020-04-20
Attending: INTERNAL MEDICINE
Payer: COMMERCIAL

## 2020-04-20 DIAGNOSIS — C49.9: Primary | ICD-10-CM

## 2020-04-20 LAB
ALBUMIN SERPL BCG-MCNC: 3.9 GM/DL (ref 3.2–5.2)
ALT SERPL W P-5'-P-CCNC: 19 U/L (ref 12–78)
APPEARANCE UR: (no result)
BACTERIA UR QL AUTO: NEGATIVE
BASOPHILS # BLD AUTO: 0 10^3/UL (ref 0–0.2)
BASOPHILS NFR BLD AUTO: 0.3 % (ref 0–1)
BILIRUB SERPL-MCNC: 0.6 MG/DL (ref 0.2–1)
BILIRUB UR QL STRIP.AUTO: NEGATIVE
BUN SERPL-MCNC: 13 MG/DL (ref 7–18)
CALCIUM SERPL-MCNC: 9.1 MG/DL (ref 8.5–10.1)
CHLORIDE SERPL-SCNC: 109 MEQ/L (ref 98–107)
CO2 SERPL-SCNC: 25 MEQ/L (ref 21–32)
CREAT SERPL-MCNC: 0.52 MG/DL (ref 0.55–1.3)
EOSINOPHIL # BLD AUTO: 0 10^3/UL (ref 0–0.5)
EOSINOPHIL NFR BLD AUTO: 0.3 % (ref 0–3)
GLUCOSE SERPL-MCNC: 82 MG/DL (ref 70–100)
GLUCOSE UR QL STRIP.AUTO: NEGATIVE MG/DL
HCT VFR BLD AUTO: 27.6 % (ref 36–47)
HGB BLD-MCNC: 8.8 G/DL (ref 12–15.5)
HGB UR QL STRIP.AUTO: NEGATIVE
KETONES UR QL STRIP.AUTO: NEGATIVE MG/DL
LEUKOCYTE ESTERASE UR QL STRIP.AUTO: NEGATIVE
LYMPHOCYTES # BLD AUTO: 0.6 10^3/UL (ref 1.5–5)
LYMPHOCYTES NFR BLD AUTO: 14.9 % (ref 24–44)
MCH RBC QN AUTO: 29.6 PG (ref 27–33)
MCHC RBC AUTO-ENTMCNC: 31.9 G/DL (ref 32–36.5)
MCV RBC AUTO: 92.9 FL (ref 80–96)
MONOCYTES # BLD AUTO: 0.5 10^3/UL (ref 0–0.8)
MONOCYTES NFR BLD AUTO: 12.5 % (ref 0–5)
MUCOUS THREADS URNS QL MICRO: (no result)
NEUTROPHILS # BLD AUTO: 2.7 10^3/UL (ref 1.5–8.5)
NEUTROPHILS NFR BLD AUTO: 70.4 % (ref 36–66)
NITRITE UR QL STRIP.AUTO: NEGATIVE
PH UR STRIP.AUTO: 5 UNITS (ref 5–9)
PLATELET # BLD AUTO: 320 10^3/UL (ref 150–450)
POTASSIUM SERPL-SCNC: 3.5 MEQ/L (ref 3.5–5.1)
PROT SERPL-MCNC: 7 GM/DL (ref 6.4–8.2)
PROT UR QL STRIP.AUTO: NEGATIVE MG/DL
RBC # BLD AUTO: 2.97 10^6/UL (ref 4–5.4)
RBC # UR AUTO: 1 /HPF (ref 0–3)
SODIUM SERPL-SCNC: 142 MEQ/L (ref 136–145)
SP GR UR STRIP.AUTO: 1.02 (ref 1–1.03)
SQUAMOUS #/AREA URNS AUTO: 4 /HPF (ref 0–6)
UROBILINOGEN UR QL STRIP.AUTO: 0.2 MG/DL (ref 0–2)
WBC # BLD AUTO: 3.8 10^3/UL (ref 4–10)
WBC #/AREA URNS AUTO: 6 /HPF (ref 0–3)

## 2020-05-14 ENCOUNTER — HOSPITAL ENCOUNTER (OUTPATIENT)
Dept: HOSPITAL 53 - M LAB REF | Age: 20
End: 2020-05-14
Attending: INTERNAL MEDICINE
Payer: COMMERCIAL

## 2020-05-14 DIAGNOSIS — C49.9: Primary | ICD-10-CM

## 2020-05-14 LAB
ALBUMIN SERPL BCG-MCNC: 4.1 GM/DL (ref 3.2–5.2)
ALT SERPL W P-5'-P-CCNC: 42 U/L (ref 12–78)
ANISOCYTOSIS BLD QL SMEAR: (no result)
APPEARANCE UR: CLEAR
BACTERIA UR QL AUTO: NEGATIVE
BILIRUB SERPL-MCNC: 0.8 MG/DL (ref 0.2–1)
BILIRUB UR QL STRIP.AUTO: NEGATIVE
BUN SERPL-MCNC: 11 MG/DL (ref 7–18)
CALCIUM SERPL-MCNC: 9.1 MG/DL (ref 8.5–10.1)
CHLORIDE SERPL-SCNC: 109 MEQ/L (ref 98–107)
CO2 SERPL-SCNC: 29 MEQ/L (ref 21–32)
CREAT SERPL-MCNC: 0.58 MG/DL (ref 0.55–1.3)
GFR SERPL CREATININE-BSD FRML MDRD: > 60 ML/MIN/{1.73_M2} (ref 60–?)
GLUCOSE SERPL-MCNC: 72 MG/DL (ref 70–100)
GLUCOSE UR QL STRIP.AUTO: NEGATIVE MG/DL
HCT VFR BLD AUTO: 27.5 % (ref 36–47)
HGB BLD-MCNC: 8.5 G/DL (ref 12–15.5)
HGB UR QL STRIP.AUTO: NEGATIVE
KETONES UR QL STRIP.AUTO: NEGATIVE MG/DL
LEUKOCYTE ESTERASE UR QL STRIP.AUTO: NEGATIVE
LYMPHOCYTES NFR BLD MANUAL: 6 % (ref 16–44)
MCH RBC QN AUTO: 28.5 PG (ref 27–33)
MCHC RBC AUTO-ENTMCNC: 30.9 G/DL (ref 32–36.5)
MCV RBC AUTO: 92.3 FL (ref 80–96)
METAMYELOCYTES NFR BLD MANUAL: 3 % (ref 0–0)
MONOCYTES NFR BLD MANUAL: 2 % (ref 0–5)
MUCOUS THREADS URNS QL MICRO: (no result)
NEUTROPHILS NFR BLD MANUAL: 84 % (ref 28–66)
NITRITE UR QL STRIP.AUTO: NEGATIVE
PH UR STRIP.AUTO: 7 UNITS (ref 5–9)
PLATELET # BLD AUTO: 186 10^3/UL (ref 150–450)
PLATELET BLD QL SMEAR: NORMAL
POLYCHROMASIA BLD QL SMEAR: (no result)
POTASSIUM SERPL-SCNC: 3.6 MEQ/L (ref 3.5–5.1)
PROT SERPL-MCNC: 7.2 GM/DL (ref 6.4–8.2)
PROT UR QL STRIP.AUTO: NEGATIVE MG/DL
RBC # BLD AUTO: 2.98 10^6/UL (ref 4–5.4)
RBC # UR AUTO: 1 /HPF (ref 0–3)
SODIUM SERPL-SCNC: 143 MEQ/L (ref 136–145)
SP GR UR STRIP.AUTO: 1.02 (ref 1–1.03)
SQUAMOUS #/AREA URNS AUTO: 3 /HPF (ref 0–6)
UROBILINOGEN UR QL STRIP.AUTO: 0.2 MG/DL (ref 0–2)
WBC # BLD AUTO: 12.2 10^3/UL (ref 4–10)
WBC #/AREA URNS AUTO: 2 /HPF (ref 0–3)

## 2020-06-04 ENCOUNTER — HOSPITAL ENCOUNTER (OUTPATIENT)
Dept: HOSPITAL 53 - M LAB REF | Age: 20
End: 2020-06-04
Attending: INTERNAL MEDICINE
Payer: COMMERCIAL

## 2020-06-04 DIAGNOSIS — C49.9: Primary | ICD-10-CM

## 2020-06-04 LAB
ALBUMIN SERPL BCG-MCNC: 4 GM/DL (ref 3.2–5.2)
ALT SERPL W P-5'-P-CCNC: 28 U/L (ref 12–78)
APPEARANCE UR: CLEAR
BACTERIA UR QL AUTO: (no result)
BASOPHILS # BLD AUTO: 0 10^3/UL (ref 0–0.2)
BASOPHILS NFR BLD AUTO: 0.5 % (ref 0–1)
BILIRUB SERPL-MCNC: 0.8 MG/DL (ref 0.2–1)
BILIRUB UR QL STRIP.AUTO: NEGATIVE
BUN SERPL-MCNC: 10 MG/DL (ref 7–18)
CALCIUM SERPL-MCNC: 9.3 MG/DL (ref 8.5–10.1)
CHLORIDE SERPL-SCNC: 108 MEQ/L (ref 98–107)
CO2 SERPL-SCNC: 28 MEQ/L (ref 21–32)
CREAT SERPL-MCNC: 0.49 MG/DL (ref 0.55–1.3)
EOSINOPHIL # BLD AUTO: 0 10^3/UL (ref 0–0.5)
EOSINOPHIL NFR BLD AUTO: 0.8 % (ref 0–3)
GFR SERPL CREATININE-BSD FRML MDRD: > 60 ML/MIN/{1.73_M2} (ref 60–?)
GLUCOSE SERPL-MCNC: 68 MG/DL (ref 70–100)
GLUCOSE UR QL STRIP.AUTO: NEGATIVE MG/DL
HCT VFR BLD AUTO: 30.6 % (ref 36–47)
HGB BLD-MCNC: 9.9 G/DL (ref 12–15.5)
HGB UR QL STRIP.AUTO: NEGATIVE
KETONES UR QL STRIP.AUTO: NEGATIVE MG/DL
LEUKOCYTE ESTERASE UR QL STRIP.AUTO: (no result)
LYMPHOCYTES # BLD AUTO: 0.7 10^3/UL (ref 1.5–5)
LYMPHOCYTES NFR BLD AUTO: 18.9 % (ref 24–44)
MCH RBC QN AUTO: 28.4 PG (ref 27–33)
MCHC RBC AUTO-ENTMCNC: 32.4 G/DL (ref 32–36.5)
MCV RBC AUTO: 87.9 FL (ref 80–96)
MONOCYTES # BLD AUTO: 0.4 10^3/UL (ref 0–0.8)
MONOCYTES NFR BLD AUTO: 10.7 % (ref 0–5)
MUCOUS THREADS URNS QL MICRO: (no result)
NEUTROPHILS # BLD AUTO: 2.4 10^3/UL (ref 1.5–8.5)
NEUTROPHILS NFR BLD AUTO: 65 % (ref 36–66)
NITRITE UR QL STRIP.AUTO: NEGATIVE
PH UR STRIP.AUTO: 6 UNITS (ref 5–9)
PLATELET # BLD AUTO: 297 10^3/UL (ref 150–450)
POTASSIUM SERPL-SCNC: 3.6 MEQ/L (ref 3.5–5.1)
PROT SERPL-MCNC: 7.3 GM/DL (ref 6.4–8.2)
PROT UR QL STRIP.AUTO: NEGATIVE MG/DL
RBC # BLD AUTO: 3.48 10^6/UL (ref 4–5.4)
RBC # UR AUTO: 4 /HPF (ref 0–3)
SODIUM SERPL-SCNC: 142 MEQ/L (ref 136–145)
SP GR UR STRIP.AUTO: 1.01 (ref 1–1.03)
SQUAMOUS #/AREA URNS AUTO: 2 /HPF (ref 0–6)
UROBILINOGEN UR QL STRIP.AUTO: 0.2 MG/DL (ref 0–2)
WBC # BLD AUTO: 3.7 10^3/UL (ref 4–10)
WBC #/AREA URNS AUTO: 4 /HPF (ref 0–3)

## 2020-09-08 ENCOUNTER — HOSPITAL ENCOUNTER (OUTPATIENT)
Dept: HOSPITAL 53 - M LAB REF | Age: 20
End: 2020-09-08
Attending: INTERNAL MEDICINE
Payer: COMMERCIAL

## 2020-09-08 DIAGNOSIS — C49.9: Primary | ICD-10-CM

## 2020-09-08 LAB
ALBUMIN SERPL BCG-MCNC: 4 GM/DL (ref 3.2–5.2)
ALT SERPL W P-5'-P-CCNC: 65 U/L (ref 12–78)
ANISOCYTOSIS BLD QL SMEAR: (no result)
BILIRUB SERPL-MCNC: 1.2 MG/DL (ref 0.2–1)
BUN SERPL-MCNC: 15 MG/DL (ref 7–18)
CALCIUM SERPL-MCNC: 9.6 MG/DL (ref 8.5–10.1)
CHLORIDE SERPL-SCNC: 103 MEQ/L (ref 98–107)
CO2 SERPL-SCNC: 29 MEQ/L (ref 21–32)
CREAT SERPL-MCNC: 0.51 MG/DL (ref 0.55–1.3)
GFR SERPL CREATININE-BSD FRML MDRD: > 60 ML/MIN/{1.73_M2} (ref 60–?)
GLUCOSE SERPL-MCNC: 87 MG/DL (ref 70–100)
HCT VFR BLD AUTO: 36.6 % (ref 36–47)
HGB BLD-MCNC: 12 G/DL (ref 12–15.5)
LYMPHOCYTES NFR BLD MANUAL: 14 % (ref 16–44)
MCH RBC QN AUTO: 29.8 PG (ref 27–33)
MCHC RBC AUTO-ENTMCNC: 32.8 G/DL (ref 32–36.5)
MCV RBC AUTO: 90.8 FL (ref 80–96)
MONOCYTES NFR BLD MANUAL: 1 % (ref 0–5)
NEUTROPHILS NFR BLD MANUAL: 82 % (ref 28–66)
OVALOCYTES BLD QL SMEAR: (no result)
PLATELET # BLD AUTO: 78 10^3/UL (ref 150–450)
PLATELET BLD QL SMEAR: NORMAL
POIKILOCYTOSIS BLD QL SMEAR: (no result)
POTASSIUM SERPL-SCNC: 3.9 MEQ/L (ref 3.5–5.1)
PROT SERPL-MCNC: 7.7 GM/DL (ref 6.4–8.2)
RBC # BLD AUTO: 4.03 10^6/UL (ref 4–5.4)
SODIUM SERPL-SCNC: 137 MEQ/L (ref 136–145)
VARIANT LYMPHS NFR BLD MANUAL: 2 % (ref 0–5)
WBC # BLD AUTO: 1.5 10^3/UL (ref 4–10)

## 2020-09-10 ENCOUNTER — HOSPITAL ENCOUNTER (OUTPATIENT)
Dept: HOSPITAL 53 - M ONCM | Age: 20
End: 2020-09-10
Attending: INTERNAL MEDICINE
Payer: COMMERCIAL

## 2020-09-10 DIAGNOSIS — C49.9: Primary | ICD-10-CM

## 2020-09-10 LAB
ALBUMIN SERPL BCG-MCNC: 3.7 GM/DL (ref 3.2–5.2)
ALT SERPL W P-5'-P-CCNC: 41 U/L (ref 12–78)
BASOPHILS # BLD AUTO: 0 10^3/UL (ref 0–0.2)
BASOPHILS NFR BLD AUTO: 0 % (ref 0–1)
BILIRUB SERPL-MCNC: 1.2 MG/DL (ref 0.2–1)
BUN SERPL-MCNC: 12 MG/DL (ref 7–18)
CALCIUM SERPL-MCNC: 9.2 MG/DL (ref 8.5–10.1)
CHLORIDE SERPL-SCNC: 101 MEQ/L (ref 98–107)
CO2 SERPL-SCNC: 28 MEQ/L (ref 21–32)
CREAT SERPL-MCNC: 0.44 MG/DL (ref 0.55–1.3)
EOSINOPHIL # BLD AUTO: 0 10^3/UL (ref 0–0.5)
EOSINOPHIL NFR BLD AUTO: 0 % (ref 0–3)
GFR SERPL CREATININE-BSD FRML MDRD: > 60 ML/MIN/{1.73_M2} (ref 60–?)
GLUCOSE SERPL-MCNC: 73 MG/DL (ref 70–100)
HCT VFR BLD AUTO: 31.3 % (ref 36–47)
HGB BLD-MCNC: 10.4 G/DL (ref 12–15.5)
LYMPHOCYTES # BLD AUTO: 0.1 10^3/UL (ref 1.5–5)
LYMPHOCYTES NFR BLD AUTO: 61.5 % (ref 24–44)
MCH RBC QN AUTO: 29.5 PG (ref 27–33)
MCHC RBC AUTO-ENTMCNC: 33.2 G/DL (ref 32–36.5)
MCV RBC AUTO: 88.7 FL (ref 80–96)
MONOCYTES # BLD AUTO: 0 10^3/UL (ref 0–0.8)
MONOCYTES NFR BLD AUTO: 15.4 % (ref 0–5)
NEUTROPHILS # BLD AUTO: 0 10^3/UL (ref 1.5–8.5)
NEUTROPHILS NFR BLD AUTO: 15.4 % (ref 36–66)
PLATELET # BLD AUTO: 17 10^3/UL (ref 150–450)
POTASSIUM SERPL-SCNC: 3.7 MEQ/L (ref 3.5–5.1)
PROT SERPL-MCNC: 7.6 GM/DL (ref 6.4–8.2)
RBC # BLD AUTO: 3.53 10^6/UL (ref 4–5.4)
SODIUM SERPL-SCNC: 134 MEQ/L (ref 136–145)
WBC # BLD AUTO: 0.1 10^3/UL (ref 4–10)

## 2020-10-01 ENCOUNTER — HOSPITAL ENCOUNTER (OUTPATIENT)
Dept: HOSPITAL 53 - M LAB REF | Age: 20
End: 2020-10-01
Attending: INTERNAL MEDICINE
Payer: COMMERCIAL

## 2020-10-01 DIAGNOSIS — C49.9: Primary | ICD-10-CM

## 2020-10-01 LAB
ALBUMIN SERPL BCG-MCNC: 3.7 GM/DL (ref 3.2–5.2)
ALT SERPL W P-5'-P-CCNC: 94 U/L (ref 12–78)
BILIRUB SERPL-MCNC: 0.4 MG/DL (ref 0.2–1)
BUN SERPL-MCNC: 15 MG/DL (ref 7–18)
CALCIUM SERPL-MCNC: 9.2 MG/DL (ref 8.5–10.1)
CHLORIDE SERPL-SCNC: 106 MEQ/L (ref 98–107)
CO2 SERPL-SCNC: 26 MEQ/L (ref 21–32)
CREAT SERPL-MCNC: 0.52 MG/DL (ref 0.55–1.3)
GFR SERPL CREATININE-BSD FRML MDRD: > 60 ML/MIN/{1.73_M2} (ref 60–?)
GLUCOSE SERPL-MCNC: 113 MG/DL (ref 70–100)
HCT VFR BLD AUTO: 21.7 % (ref 36–47)
HGB BLD-MCNC: 6.9 G/DL (ref 12–15.5)
HYPOCHROMIA BLD QL SMEAR: (no result)
MCH RBC QN AUTO: 30 PG (ref 27–33)
MCHC RBC AUTO-ENTMCNC: 31.8 G/DL (ref 32–36.5)
MCV RBC AUTO: 94.3 FL (ref 80–96)
NEUTROPHILS NFR BLD MANUAL: 96 % (ref 28–66)
PLATELET # BLD AUTO: 425 10^3/UL (ref 150–450)
PLATELET BLD QL SMEAR: NORMAL
POIKILOCYTOSIS BLD QL SMEAR: (no result)
POTASSIUM SERPL-SCNC: 4.1 MEQ/L (ref 3.5–5.1)
PROT SERPL-MCNC: 6.8 GM/DL (ref 6.4–8.2)
RBC # BLD AUTO: 2.3 10^6/UL (ref 4–5.4)
SODIUM SERPL-SCNC: 138 MEQ/L (ref 136–145)
WBC # BLD AUTO: 3.4 10^3/UL (ref 4–10)

## 2020-10-02 ENCOUNTER — HOSPITAL ENCOUNTER (OUTPATIENT)
Dept: HOSPITAL 53 - M INFU | Age: 20
Discharge: HOME | End: 2020-10-02
Attending: INTERNAL MEDICINE
Payer: COMMERCIAL

## 2020-10-02 VITALS — DIASTOLIC BLOOD PRESSURE: 56 MMHG | SYSTOLIC BLOOD PRESSURE: 99 MMHG

## 2020-10-02 VITALS — HEIGHT: 64 IN | BODY MASS INDEX: 17.8 KG/M2 | WEIGHT: 104.28 LBS

## 2020-10-02 VITALS — SYSTOLIC BLOOD PRESSURE: 100 MMHG | DIASTOLIC BLOOD PRESSURE: 53 MMHG

## 2020-10-02 VITALS — SYSTOLIC BLOOD PRESSURE: 97 MMHG | DIASTOLIC BLOOD PRESSURE: 54 MMHG

## 2020-10-02 VITALS — DIASTOLIC BLOOD PRESSURE: 55 MMHG | SYSTOLIC BLOOD PRESSURE: 99 MMHG

## 2020-10-02 VITALS — DIASTOLIC BLOOD PRESSURE: 56 MMHG | SYSTOLIC BLOOD PRESSURE: 110 MMHG

## 2020-10-02 VITALS — SYSTOLIC BLOOD PRESSURE: 114 MMHG | DIASTOLIC BLOOD PRESSURE: 59 MMHG

## 2020-10-02 DIAGNOSIS — D64.9: Primary | ICD-10-CM

## 2020-10-02 PROCEDURE — 86920 COMPATIBILITY TEST SPIN: CPT

## 2020-10-02 PROCEDURE — 36430 TRANSFUSION BLD/BLD COMPNT: CPT

## 2020-10-02 PROCEDURE — 86870 RBC ANTIBODY IDENTIFICATION: CPT

## 2020-10-02 PROCEDURE — 86900 BLOOD TYPING SEROLOGIC ABO: CPT

## 2020-10-02 PROCEDURE — 86901 BLOOD TYPING SEROLOGIC RH(D): CPT

## 2020-10-02 PROCEDURE — 86850 RBC ANTIBODY SCREEN: CPT

## 2020-10-06 ENCOUNTER — HOSPITAL ENCOUNTER (OUTPATIENT)
Dept: HOSPITAL 53 - M LAB REF | Age: 20
End: 2020-10-06
Attending: INTERNAL MEDICINE
Payer: COMMERCIAL

## 2020-10-06 DIAGNOSIS — C49.9: Primary | ICD-10-CM

## 2020-10-06 LAB
ALBUMIN SERPL BCG-MCNC: 3.9 GM/DL (ref 3.2–5.2)
ALT SERPL W P-5'-P-CCNC: 118 U/L (ref 12–78)
ANISOCYTOSIS BLD QL SMEAR: (no result)
BILIRUB SERPL-MCNC: 0.4 MG/DL (ref 0.2–1)
BUN SERPL-MCNC: 6 MG/DL (ref 7–18)
CALCIUM SERPL-MCNC: 9.4 MG/DL (ref 8.5–10.1)
CHLORIDE SERPL-SCNC: 106 MEQ/L (ref 98–107)
CO2 SERPL-SCNC: 29 MEQ/L (ref 21–32)
CREAT SERPL-MCNC: 0.51 MG/DL (ref 0.55–1.3)
GFR SERPL CREATININE-BSD FRML MDRD: > 60 ML/MIN/{1.73_M2} (ref 60–?)
GLUCOSE SERPL-MCNC: 77 MG/DL (ref 70–100)
HCT VFR BLD AUTO: 35.3 % (ref 36–47)
HGB BLD-MCNC: 11 G/DL (ref 12–15.5)
LYMPHOCYTES NFR BLD MANUAL: 3 % (ref 16–44)
MCH RBC QN AUTO: 29.7 PG (ref 27–33)
MCHC RBC AUTO-ENTMCNC: 31.2 G/DL (ref 32–36.5)
MCV RBC AUTO: 95.4 FL (ref 80–96)
METAMYELOCYTES NFR BLD MANUAL: 12 % (ref 0–0)
MONOCYTES NFR BLD MANUAL: 2 % (ref 0–5)
MYELOBLASTS NFR BLD MANUAL: 5 % (ref 0–0)
NEUTROPHILS NFR BLD MANUAL: 60 % (ref 28–66)
PLATELET # BLD AUTO: 136 10^3/UL (ref 150–450)
PLATELET BLD QL SMEAR: (no result)
POTASSIUM SERPL-SCNC: 3.8 MEQ/L (ref 3.5–5.1)
PROMYELOCYTES # BLD MANUAL: 1 % (ref 0–0)
PROT SERPL-MCNC: 7.4 GM/DL (ref 6.4–8.2)
RBC # BLD AUTO: 3.7 10^6/UL (ref 4–5.4)
SODIUM SERPL-SCNC: 140 MEQ/L (ref 136–145)
WBC # BLD AUTO: 47.5 10^3/UL (ref 4–10)

## 2020-10-08 ENCOUNTER — HOSPITAL ENCOUNTER (OUTPATIENT)
Dept: HOSPITAL 53 - M LAB REF | Age: 20
End: 2020-10-08
Attending: INTERNAL MEDICINE
Payer: COMMERCIAL

## 2020-10-08 DIAGNOSIS — C49.9: Primary | ICD-10-CM

## 2020-10-08 LAB
ANISOCYTOSIS BLD QL SMEAR: (no result)
HCT VFR BLD AUTO: 41.9 % (ref 36–47)
HGB BLD-MCNC: 13.3 G/DL (ref 12–15.5)
LYMPHOCYTES NFR BLD MANUAL: 3 % (ref 16–44)
MCH RBC QN AUTO: 30.2 PG (ref 27–33)
MCHC RBC AUTO-ENTMCNC: 31.7 G/DL (ref 32–36.5)
MCV RBC AUTO: 95.2 FL (ref 80–96)
METAMYELOCYTES NFR BLD MANUAL: 4 % (ref 0–0)
MONOCYTES NFR BLD MANUAL: 7 % (ref 0–5)
MYELOBLASTS NFR BLD MANUAL: 5 % (ref 0–0)
NEUTROPHILS NFR BLD MANUAL: 73 % (ref 28–66)
PLATELET # BLD AUTO: 154 10^3/UL (ref 150–450)
PLATELET BLD QL SMEAR: NORMAL
PROMYELOCYTES # BLD MANUAL: 1 % (ref 0–0)
RBC # BLD AUTO: 4.4 10^6/UL (ref 4–5.4)
WBC # BLD AUTO: 16.6 10^3/UL (ref 4–10)

## 2020-10-19 ENCOUNTER — HOSPITAL ENCOUNTER (OUTPATIENT)
Dept: HOSPITAL 53 - M LAB REF | Age: 20
End: 2020-10-19
Attending: INTERNAL MEDICINE
Payer: COMMERCIAL

## 2020-10-19 DIAGNOSIS — C49.9: Primary | ICD-10-CM

## 2020-10-19 LAB
ALBUMIN SERPL BCG-MCNC: 3.8 GM/DL (ref 3.2–5.2)
ALT SERPL W P-5'-P-CCNC: 81 U/L (ref 12–78)
ANISOCYTOSIS BLD QL SMEAR: (no result)
BILIRUB SERPL-MCNC: 0.9 MG/DL (ref 0.2–1)
BUN SERPL-MCNC: 19 MG/DL (ref 7–18)
CALCIUM SERPL-MCNC: 9.1 MG/DL (ref 8.5–10.1)
CHLORIDE SERPL-SCNC: 105 MEQ/L (ref 98–107)
CO2 SERPL-SCNC: 28 MEQ/L (ref 21–32)
CREAT SERPL-MCNC: 0.64 MG/DL (ref 0.55–1.3)
GFR SERPL CREATININE-BSD FRML MDRD: > 60 ML/MIN/{1.73_M2} (ref 60–?)
GLUCOSE SERPL-MCNC: 79 MG/DL (ref 70–100)
HCT VFR BLD AUTO: 31.2 % (ref 36–47)
HGB BLD-MCNC: 9.6 G/DL (ref 12–15.5)
HYPOCHROMIA BLD QL SMEAR: (no result)
LYMPHOCYTES NFR BLD MANUAL: 5 % (ref 16–44)
MCH RBC QN AUTO: 29.6 PG (ref 27–33)
MCHC RBC AUTO-ENTMCNC: 30.8 G/DL (ref 32–36.5)
MCV RBC AUTO: 96.3 FL (ref 80–96)
NEUTROPHILS NFR BLD MANUAL: 89 % (ref 28–66)
NEUTS HYPERSEG BLD QL SMEAR: (no result)
PLATELET # BLD AUTO: 370 10^3/UL (ref 150–450)
PLATELET BLD QL SMEAR: NORMAL
POIKILOCYTOSIS BLD QL SMEAR: (no result)
POTASSIUM SERPL-SCNC: 3.3 MEQ/L (ref 3.5–5.1)
PROT SERPL-MCNC: 6.8 GM/DL (ref 6.4–8.2)
RBC # BLD AUTO: 3.24 10^6/UL (ref 4–5.4)
SODIUM SERPL-SCNC: 138 MEQ/L (ref 136–145)
WBC # BLD AUTO: 12.7 10^3/UL (ref 4–10)

## 2020-10-22 ENCOUNTER — HOSPITAL ENCOUNTER (OUTPATIENT)
Dept: HOSPITAL 53 - M LAB REF | Age: 20
End: 2020-10-22
Attending: INTERNAL MEDICINE
Payer: COMMERCIAL

## 2020-10-22 DIAGNOSIS — C49.9: Primary | ICD-10-CM

## 2020-10-22 LAB
ALBUMIN SERPL BCG-MCNC: 3.7 GM/DL (ref 3.2–5.2)
ALT SERPL W P-5'-P-CCNC: 81 U/L (ref 12–78)
BASOPHILS # BLD AUTO: 0 10^3/UL (ref 0–0.2)
BASOPHILS NFR BLD AUTO: 2 % (ref 0–1)
BILIRUB SERPL-MCNC: 0.5 MG/DL (ref 0.2–1)
BUN SERPL-MCNC: 14 MG/DL (ref 7–18)
CALCIUM SERPL-MCNC: 9.1 MG/DL (ref 8.5–10.1)
CHLORIDE SERPL-SCNC: 103 MEQ/L (ref 98–107)
CO2 SERPL-SCNC: 30 MEQ/L (ref 21–32)
CREAT SERPL-MCNC: 0.82 MG/DL (ref 0.55–1.3)
EOSINOPHIL # BLD AUTO: 0 10^3/UL (ref 0–0.5)
EOSINOPHIL NFR BLD AUTO: 8.2 % (ref 0–3)
GFR SERPL CREATININE-BSD FRML MDRD: > 60 ML/MIN/{1.73_M2} (ref 60–?)
GLUCOSE SERPL-MCNC: 81 MG/DL (ref 70–100)
HCT VFR BLD AUTO: 30.6 % (ref 36–47)
HGB BLD-MCNC: 9.6 G/DL (ref 12–15.5)
LYMPHOCYTES # BLD AUTO: 0.2 10^3/UL (ref 1.5–5)
LYMPHOCYTES NFR BLD AUTO: 36.7 % (ref 24–44)
MCH RBC QN AUTO: 30 PG (ref 27–33)
MCHC RBC AUTO-ENTMCNC: 31.4 G/DL (ref 32–36.5)
MCV RBC AUTO: 95.6 FL (ref 80–96)
MONOCYTES # BLD AUTO: 0.1 10^3/UL (ref 0–0.8)
MONOCYTES NFR BLD AUTO: 24.5 % (ref 0–5)
NEUTROPHILS # BLD AUTO: 0.1 10^3/UL (ref 1.5–8.5)
NEUTROPHILS NFR BLD AUTO: 26.6 % (ref 36–66)
PLATELET # BLD AUTO: 145 10^3/UL (ref 150–450)
POTASSIUM SERPL-SCNC: 3.7 MEQ/L (ref 3.5–5.1)
PROT SERPL-MCNC: 7 GM/DL (ref 6.4–8.2)
RBC # BLD AUTO: 3.2 10^6/UL (ref 4–5.4)
SODIUM SERPL-SCNC: 138 MEQ/L (ref 136–145)
WBC # BLD AUTO: 0.5 10^3/UL (ref 4–10)

## 2020-10-26 ENCOUNTER — HOSPITAL ENCOUNTER (OUTPATIENT)
Dept: HOSPITAL 53 - M LAB REF | Age: 20
End: 2020-10-26
Attending: INTERNAL MEDICINE
Payer: COMMERCIAL

## 2020-10-26 DIAGNOSIS — C49.9: Primary | ICD-10-CM

## 2020-10-26 LAB
ALBUMIN SERPL BCG-MCNC: 3.9 GM/DL (ref 3.2–5.2)
ALT SERPL W P-5'-P-CCNC: 79 U/L (ref 12–78)
BASOPHILS # BLD AUTO: 0.1 10^3/UL (ref 0–0.2)
BASOPHILS NFR BLD AUTO: 1.5 % (ref 0–1)
BILIRUB SERPL-MCNC: 0.2 MG/DL (ref 0.2–1)
BUN SERPL-MCNC: 9 MG/DL (ref 7–18)
CALCIUM SERPL-MCNC: 9.2 MG/DL (ref 8.5–10.1)
CHLORIDE SERPL-SCNC: 107 MEQ/L (ref 98–107)
CO2 SERPL-SCNC: 29 MEQ/L (ref 21–32)
CREAT SERPL-MCNC: 0.52 MG/DL (ref 0.55–1.3)
EOSINOPHIL # BLD AUTO: 0 10^3/UL (ref 0–0.5)
EOSINOPHIL NFR BLD AUTO: 0.4 % (ref 0–3)
GFR SERPL CREATININE-BSD FRML MDRD: > 60 ML/MIN/{1.73_M2} (ref 60–?)
GLUCOSE SERPL-MCNC: 87 MG/DL (ref 70–100)
HCT VFR BLD AUTO: 30 % (ref 36–47)
HGB BLD-MCNC: 9.5 G/DL (ref 12–15.5)
LYMPHOCYTES # BLD AUTO: 0.5 10^3/UL (ref 1.5–5)
LYMPHOCYTES NFR BLD AUTO: 8.9 % (ref 24–44)
MCH RBC QN AUTO: 30.3 PG (ref 27–33)
MCHC RBC AUTO-ENTMCNC: 31.7 G/DL (ref 32–36.5)
MCV RBC AUTO: 95.5 FL (ref 80–96)
MONOCYTES # BLD AUTO: 1.1 10^3/UL (ref 0–0.8)
MONOCYTES NFR BLD AUTO: 19.6 % (ref 0–5)
NEUTROPHILS # BLD AUTO: 3.4 10^3/UL (ref 1.5–8.5)
NEUTROPHILS NFR BLD AUTO: 62.9 % (ref 36–66)
PLATELET # BLD AUTO: 358 10^3/UL (ref 150–450)
POTASSIUM SERPL-SCNC: 3.7 MEQ/L (ref 3.5–5.1)
PROT SERPL-MCNC: 7.4 GM/DL (ref 6.4–8.2)
RBC # BLD AUTO: 3.14 10^6/UL (ref 4–5.4)
SODIUM SERPL-SCNC: 141 MEQ/L (ref 136–145)
WBC # BLD AUTO: 5.4 10^3/UL (ref 4–10)

## 2020-11-03 ENCOUNTER — HOSPITAL ENCOUNTER (OUTPATIENT)
Dept: HOSPITAL 53 - M LAB REF | Age: 20
End: 2020-11-03
Attending: INTERNAL MEDICINE
Payer: COMMERCIAL

## 2020-11-03 DIAGNOSIS — C49.9: Primary | ICD-10-CM

## 2020-11-03 LAB
ALBUMIN SERPL BCG-MCNC: 3.8 GM/DL (ref 3.2–5.2)
ALT SERPL W P-5'-P-CCNC: 39 U/L (ref 12–78)
BASOPHILS # BLD AUTO: 0 10^3/UL (ref 0–0.2)
BASOPHILS NFR BLD AUTO: 0.5 % (ref 0–1)
BILIRUB SERPL-MCNC: 0.6 MG/DL (ref 0.2–1)
BUN SERPL-MCNC: 9 MG/DL (ref 7–18)
CALCIUM SERPL-MCNC: 9.8 MG/DL (ref 8.5–10.1)
CHLORIDE SERPL-SCNC: 106 MEQ/L (ref 98–107)
CO2 SERPL-SCNC: 28 MEQ/L (ref 21–32)
CREAT SERPL-MCNC: 0.58 MG/DL (ref 0.55–1.3)
EOSINOPHIL # BLD AUTO: 0 10^3/UL (ref 0–0.5)
EOSINOPHIL NFR BLD AUTO: 0.4 % (ref 0–3)
GFR SERPL CREATININE-BSD FRML MDRD: > 60 ML/MIN/{1.73_M2} (ref 60–?)
GLUCOSE SERPL-MCNC: 77 MG/DL (ref 70–100)
HCT VFR BLD AUTO: 32.1 % (ref 36–47)
HGB BLD-MCNC: 10 G/DL (ref 12–15.5)
LYMPHOCYTES # BLD AUTO: 0.6 10^3/UL (ref 1.5–5)
LYMPHOCYTES NFR BLD AUTO: 10 % (ref 24–44)
MCH RBC QN AUTO: 30.8 PG (ref 27–33)
MCHC RBC AUTO-ENTMCNC: 31.2 G/DL (ref 32–36.5)
MCV RBC AUTO: 98.8 FL (ref 80–96)
MONOCYTES # BLD AUTO: 1.3 10^3/UL (ref 0–0.8)
MONOCYTES NFR BLD AUTO: 22 % (ref 0–5)
NEUTROPHILS # BLD AUTO: 3.8 10^3/UL (ref 1.5–8.5)
NEUTROPHILS NFR BLD AUTO: 66.4 % (ref 36–66)
PLATELET # BLD AUTO: 778 10^3/UL (ref 150–450)
POTASSIUM SERPL-SCNC: 4.2 MEQ/L (ref 3.5–5.1)
PROT SERPL-MCNC: 7 GM/DL (ref 6.4–8.2)
RBC # BLD AUTO: 3.25 10^6/UL (ref 4–5.4)
SODIUM SERPL-SCNC: 140 MEQ/L (ref 136–145)
WBC # BLD AUTO: 5.7 10^3/UL (ref 4–10)

## 2020-11-06 ENCOUNTER — HOSPITAL ENCOUNTER (OUTPATIENT)
Dept: HOSPITAL 53 - M LAB REF | Age: 20
End: 2020-11-06
Attending: INTERNAL MEDICINE
Payer: COMMERCIAL

## 2020-11-06 DIAGNOSIS — C49.9: Primary | ICD-10-CM

## 2020-11-06 LAB
ALBUMIN SERPL BCG-MCNC: 4 GM/DL (ref 3.2–5.2)
ALT SERPL W P-5'-P-CCNC: 37 U/L (ref 12–78)
BASOPHILS # BLD AUTO: 0 10^3/UL (ref 0–0.2)
BASOPHILS NFR BLD AUTO: 0.6 % (ref 0–1)
BILIRUB SERPL-MCNC: 0.4 MG/DL (ref 0.2–1)
BUN SERPL-MCNC: 10 MG/DL (ref 7–18)
CALCIUM SERPL-MCNC: 10.1 MG/DL (ref 8.5–10.1)
CHLORIDE SERPL-SCNC: 104 MEQ/L (ref 98–107)
CO2 SERPL-SCNC: 30 MEQ/L (ref 21–32)
CREAT SERPL-MCNC: 0.67 MG/DL (ref 0.55–1.3)
EOSINOPHIL # BLD AUTO: 0 10^3/UL (ref 0–0.5)
EOSINOPHIL NFR BLD AUTO: 0.6 % (ref 0–3)
GFR SERPL CREATININE-BSD FRML MDRD: > 60 ML/MIN/{1.73_M2} (ref 60–?)
GLUCOSE SERPL-MCNC: 97 MG/DL (ref 70–100)
HCT VFR BLD AUTO: 35.6 % (ref 36–47)
HGB BLD-MCNC: 11 G/DL (ref 12–15.5)
LYMPHOCYTES # BLD AUTO: 0.5 10^3/UL (ref 1.5–5)
LYMPHOCYTES NFR BLD AUTO: 9 % (ref 24–44)
MCH RBC QN AUTO: 30.5 PG (ref 27–33)
MCHC RBC AUTO-ENTMCNC: 30.9 G/DL (ref 32–36.5)
MCV RBC AUTO: 98.6 FL (ref 80–96)
MONOCYTES # BLD AUTO: 1.1 10^3/UL (ref 0–0.8)
MONOCYTES NFR BLD AUTO: 21.4 % (ref 0–5)
NEUTROPHILS # BLD AUTO: 3.4 10^3/UL (ref 1.5–8.5)
NEUTROPHILS NFR BLD AUTO: 67.8 % (ref 36–66)
PLATELET # BLD AUTO: 757 10^3/UL (ref 150–450)
POTASSIUM SERPL-SCNC: 4.6 MEQ/L (ref 3.5–5.1)
PROT SERPL-MCNC: 7 GM/DL (ref 6.4–8.2)
RBC # BLD AUTO: 3.61 10^6/UL (ref 4–5.4)
SODIUM SERPL-SCNC: 137 MEQ/L (ref 136–145)
WBC # BLD AUTO: 5 10^3/UL (ref 4–10)

## 2020-11-19 ENCOUNTER — HOSPITAL ENCOUNTER (OUTPATIENT)
Dept: HOSPITAL 53 - M LAB REF | Age: 20
End: 2020-11-19
Attending: INTERNAL MEDICINE
Payer: COMMERCIAL

## 2020-11-19 DIAGNOSIS — C49.9: Primary | ICD-10-CM

## 2020-11-19 LAB
ALBUMIN SERPL BCG-MCNC: 3.9 GM/DL (ref 3.2–5.2)
ALT SERPL W P-5'-P-CCNC: 46 U/L (ref 12–78)
BASOPHILS NFR BLD MANUAL: 3 % (ref 0–1)
BILIRUB SERPL-MCNC: 0.5 MG/DL (ref 0.2–1)
BUN SERPL-MCNC: 8 MG/DL (ref 7–18)
CALCIUM SERPL-MCNC: 8.9 MG/DL (ref 8.5–10.1)
CHLORIDE SERPL-SCNC: 107 MEQ/L (ref 98–107)
CO2 SERPL-SCNC: 29 MEQ/L (ref 21–32)
CREAT SERPL-MCNC: 0.69 MG/DL (ref 0.55–1.3)
EOSINOPHIL NFR BLD MANUAL: 4 % (ref 0–3)
GFR SERPL CREATININE-BSD FRML MDRD: > 60 ML/MIN/{1.73_M2} (ref 60–?)
GLUCOSE SERPL-MCNC: 90 MG/DL (ref 70–100)
HCT VFR BLD AUTO: 30.4 % (ref 36–47)
HGB BLD-MCNC: 9.4 G/DL (ref 12–15.5)
HYPOCHROMIA BLD QL SMEAR: (no result)
LYMPHOCYTES NFR BLD MANUAL: 7 % (ref 16–44)
MACROCYTES BLD QL SMEAR: (no result)
MCH RBC QN AUTO: 30 PG (ref 27–33)
MCHC RBC AUTO-ENTMCNC: 30.9 G/DL (ref 32–36.5)
MCV RBC AUTO: 97.1 FL (ref 80–96)
MONOCYTES NFR BLD MANUAL: 1 % (ref 0–5)
NEUTROPHILS NFR BLD MANUAL: 85 % (ref 28–66)
PLATELET # BLD AUTO: 137 10^3/UL (ref 150–450)
PLATELET BLD QL SMEAR: NORMAL
POIKILOCYTOSIS BLD QL SMEAR: (no result)
POTASSIUM SERPL-SCNC: 3.7 MEQ/L (ref 3.5–5.1)
PROT SERPL-MCNC: 6.8 GM/DL (ref 6.4–8.2)
RBC # BLD AUTO: 3.13 10^6/UL (ref 4–5.4)
SODIUM SERPL-SCNC: 141 MEQ/L (ref 136–145)
WBC # BLD AUTO: 1.9 10^3/UL (ref 4–10)

## 2020-12-08 ENCOUNTER — HOSPITAL ENCOUNTER (OUTPATIENT)
Dept: HOSPITAL 53 - M LAB REF | Age: 20
End: 2020-12-08
Attending: INTERNAL MEDICINE
Payer: COMMERCIAL

## 2020-12-08 DIAGNOSIS — C49.9: Primary | ICD-10-CM

## 2020-12-08 LAB
ALBUMIN SERPL BCG-MCNC: 4 GM/DL (ref 3.2–5.2)
ALT SERPL W P-5'-P-CCNC: 25 U/L (ref 12–78)
BASOPHILS # BLD AUTO: 0 10^3/UL (ref 0–0.2)
BASOPHILS NFR BLD AUTO: 1.1 % (ref 0–1)
BILIRUB SERPL-MCNC: 0.6 MG/DL (ref 0.2–1)
BUN SERPL-MCNC: 9 MG/DL (ref 7–18)
CALCIUM SERPL-MCNC: 9.7 MG/DL (ref 8.5–10.1)
CHLORIDE SERPL-SCNC: 105 MEQ/L (ref 98–107)
CO2 SERPL-SCNC: 30 MEQ/L (ref 21–32)
CREAT SERPL-MCNC: 0.57 MG/DL (ref 0.55–1.3)
EOSINOPHIL # BLD AUTO: 0 10^3/UL (ref 0–0.5)
EOSINOPHIL NFR BLD AUTO: 0.4 % (ref 0–3)
GFR SERPL CREATININE-BSD FRML MDRD: > 60 ML/MIN/{1.73_M2} (ref 60–?)
GLUCOSE SERPL-MCNC: 88 MG/DL (ref 70–100)
HCT VFR BLD AUTO: 32.8 % (ref 36–47)
HGB BLD-MCNC: 10.2 G/DL (ref 12–15.5)
LYMPHOCYTES # BLD AUTO: 0.3 10^3/UL (ref 1.5–5)
LYMPHOCYTES NFR BLD AUTO: 11.9 % (ref 24–44)
MCH RBC QN AUTO: 31.1 PG (ref 27–33)
MCHC RBC AUTO-ENTMCNC: 31.1 G/DL (ref 32–36.5)
MCV RBC AUTO: 100 FL (ref 80–96)
MONOCYTES # BLD AUTO: 0.5 10^3/UL (ref 0–0.8)
MONOCYTES NFR BLD AUTO: 17.8 % (ref 0–5)
NEUTROPHILS # BLD AUTO: 1.9 10^3/UL (ref 1.5–8.5)
NEUTROPHILS NFR BLD AUTO: 68.4 % (ref 36–66)
PLATELET # BLD AUTO: 860 10^3/UL (ref 150–450)
POTASSIUM SERPL-SCNC: 4.7 MEQ/L (ref 3.5–5.1)
PROT SERPL-MCNC: 7.1 GM/DL (ref 6.4–8.2)
RBC # BLD AUTO: 3.28 10^6/UL (ref 4–5.4)
SODIUM SERPL-SCNC: 138 MEQ/L (ref 136–145)
WBC # BLD AUTO: 2.7 10^3/UL (ref 4–10)

## 2021-06-18 ENCOUNTER — HOSPITAL ENCOUNTER (OUTPATIENT)
Dept: HOSPITAL 53 - M LAB REF | Age: 21
End: 2021-06-18
Attending: INTERNAL MEDICINE
Payer: COMMERCIAL

## 2021-06-18 DIAGNOSIS — C49.9: ICD-10-CM

## 2021-06-18 DIAGNOSIS — C41.9: Primary | ICD-10-CM

## 2021-06-18 LAB
ALBUMIN SERPL BCG-MCNC: 3.9 GM/DL (ref 3.2–5.2)
ALT SERPL W P-5'-P-CCNC: 79 U/L (ref 12–78)
BASOPHILS # BLD AUTO: 0 10^3/UL (ref 0–0.2)
BASOPHILS NFR BLD AUTO: 0.5 % (ref 0–1)
BILIRUB SERPL-MCNC: 0.5 MG/DL (ref 0.2–1)
BUN SERPL-MCNC: 9 MG/DL (ref 7–18)
CALCIUM SERPL-MCNC: 8.9 MG/DL (ref 8.5–10.1)
CHLORIDE SERPL-SCNC: 107 MEQ/L (ref 98–107)
CO2 SERPL-SCNC: 28 MEQ/L (ref 21–32)
CREAT SERPL-MCNC: 0.73 MG/DL (ref 0.55–1.3)
EOSINOPHIL # BLD AUTO: 0 10^3/UL (ref 0–0.5)
EOSINOPHIL NFR BLD AUTO: 0.5 % (ref 0–3)
GFR SERPL CREATININE-BSD FRML MDRD: > 60 ML/MIN/{1.73_M2} (ref 60–?)
GLUCOSE SERPL-MCNC: 99 MG/DL (ref 70–100)
HCT VFR BLD AUTO: 40.6 % (ref 36–47)
HGB BLD-MCNC: 13 G/DL (ref 12–15.5)
LYMPHOCYTES # BLD AUTO: 0.4 10^3/UL (ref 1.5–5)
LYMPHOCYTES NFR BLD AUTO: 11.3 % (ref 24–44)
MAGNESIUM SERPL-MCNC: 2.5 MG/DL (ref 1.8–2.4)
MCH RBC QN AUTO: 30.5 PG (ref 27–33)
MCHC RBC AUTO-ENTMCNC: 32 G/DL (ref 32–36.5)
MCV RBC AUTO: 95.3 FL (ref 80–96)
MONOCYTES # BLD AUTO: 0.4 10^3/UL (ref 0–0.8)
MONOCYTES NFR BLD AUTO: 11.5 % (ref 2–8)
NEUTROPHILS # BLD AUTO: 2.9 10^3/UL (ref 1.5–8.5)
NEUTROPHILS NFR BLD AUTO: 75.7 % (ref 36–66)
PHOSPHATE SERPL-MCNC: 2.2 MG/DL (ref 2.5–4.9)
PLATELET # BLD AUTO: 334 10^3/UL (ref 150–450)
POTASSIUM SERPL-SCNC: 3.8 MEQ/L (ref 3.5–5.1)
PROT SERPL-MCNC: 7.8 GM/DL (ref 6.4–8.2)
RBC # BLD AUTO: 4.26 10^6/UL (ref 4–5.4)
SODIUM SERPL-SCNC: 136 MEQ/L (ref 136–145)
T4 FREE SERPL-MCNC: 0.97 NG/DL (ref 0.76–1.46)
TSH SERPL DL<=0.005 MIU/L-ACNC: 1.45 UIU/ML (ref 0.36–3.74)
WBC # BLD AUTO: 3.8 10^3/UL (ref 4–10)

## 2021-11-11 ENCOUNTER — HOSPITAL ENCOUNTER (OUTPATIENT)
Dept: HOSPITAL 53 - M ONCM | Age: 21
End: 2021-11-11
Attending: INTERNAL MEDICINE
Payer: COMMERCIAL

## 2021-11-11 DIAGNOSIS — C41.9: Primary | ICD-10-CM

## 2021-11-11 LAB
ALBUMIN SERPL BCG-MCNC: 3.8 GM/DL (ref 3.2–5.2)
ALT SERPL W P-5'-P-CCNC: 46 U/L (ref 12–78)
BASOPHILS # BLD AUTO: 0 10^3/UL (ref 0–0.2)
BASOPHILS NFR BLD AUTO: 0.3 % (ref 0–1)
BILIRUB SERPL-MCNC: 0.6 MG/DL (ref 0.2–1)
BUN SERPL-MCNC: 13 MG/DL (ref 7–18)
CALCIUM SERPL-MCNC: 9.6 MG/DL (ref 8.5–10.1)
CHLORIDE SERPL-SCNC: 108 MEQ/L (ref 98–107)
CO2 SERPL-SCNC: 25 MEQ/L (ref 21–32)
CREAT SERPL-MCNC: 0.72 MG/DL (ref 0.55–1.3)
EOSINOPHIL # BLD AUTO: 0.2 10^3/UL (ref 0–0.5)
EOSINOPHIL NFR BLD AUTO: 1.5 % (ref 0–3)
GFR SERPL CREATININE-BSD FRML MDRD: > 60 ML/MIN/{1.73_M2} (ref 60–?)
GLUCOSE SERPL-MCNC: 91 MG/DL (ref 70–100)
HCT VFR BLD AUTO: 33.6 % (ref 36–47)
HGB BLD-MCNC: 10.8 G/DL (ref 12–15.5)
LDH SERPL L TO P-CCNC: 174 U/L (ref 84–246)
LYMPHOCYTES # BLD AUTO: 2.1 10^3/UL (ref 1.5–5)
LYMPHOCYTES NFR BLD AUTO: 20.6 % (ref 24–44)
MAGNESIUM SERPL-MCNC: 2.3 MG/DL (ref 1.8–2.4)
MCH RBC QN AUTO: 28.6 PG (ref 27–33)
MCHC RBC AUTO-ENTMCNC: 32.1 G/DL (ref 32–36.5)
MCV RBC AUTO: 88.9 FL (ref 80–96)
MONOCYTES # BLD AUTO: 1.1 10^3/UL (ref 0–0.8)
MONOCYTES NFR BLD AUTO: 10.2 % (ref 2–8)
NEUTROPHILS # BLD AUTO: 7 10^3/UL (ref 1.5–8.5)
NEUTROPHILS NFR BLD AUTO: 67 % (ref 36–66)
PHOSPHATE SERPL-MCNC: 3.8 MG/DL (ref 2.5–4.9)
PLATELET # BLD AUTO: 533 10^3/UL (ref 150–450)
POTASSIUM SERPL-SCNC: 3.8 MEQ/L (ref 3.5–5.1)
PROT SERPL-MCNC: 7.4 GM/DL (ref 6.4–8.2)
RBC # BLD AUTO: 3.78 10^6/UL (ref 4–5.4)
SODIUM SERPL-SCNC: 139 MEQ/L (ref 136–145)
WBC # BLD AUTO: 10.4 10^3/UL (ref 4–10)

## 2021-11-11 PROCEDURE — 84100 ASSAY OF PHOSPHORUS: CPT

## 2021-11-11 PROCEDURE — 36591 DRAW BLOOD OFF VENOUS DEVICE: CPT

## 2021-11-11 PROCEDURE — 83615 LACTATE (LD) (LDH) ENZYME: CPT

## 2021-11-11 PROCEDURE — 83735 ASSAY OF MAGNESIUM: CPT

## 2021-11-11 PROCEDURE — 80053 COMPREHEN METABOLIC PANEL: CPT

## 2021-11-11 PROCEDURE — 85025 COMPLETE CBC W/AUTO DIFF WBC: CPT

## 2022-02-19 ENCOUNTER — HOSPITAL ENCOUNTER (EMERGENCY)
Dept: HOSPITAL 53 - M ED | Age: 22
Discharge: HOME | End: 2022-02-19
Payer: COMMERCIAL

## 2022-02-19 VITALS — WEIGHT: 120.26 LBS | BODY MASS INDEX: 22.13 KG/M2 | HEIGHT: 62 IN

## 2022-02-19 VITALS — SYSTOLIC BLOOD PRESSURE: 108 MMHG | DIASTOLIC BLOOD PRESSURE: 69 MMHG

## 2022-02-19 DIAGNOSIS — Z88.8: ICD-10-CM

## 2022-02-19 DIAGNOSIS — Z88.0: ICD-10-CM

## 2022-02-19 DIAGNOSIS — C49.4: ICD-10-CM

## 2022-02-19 DIAGNOSIS — M54.9: Primary | ICD-10-CM

## 2022-02-19 LAB
ALBUMIN SERPL BCG-MCNC: 3.9 GM/DL (ref 3.2–5.2)
ALT SERPL W P-5'-P-CCNC: 26 U/L (ref 12–78)
BASOPHILS # BLD AUTO: 0 10^3/UL (ref 0–0.2)
BASOPHILS NFR BLD AUTO: 0.2 % (ref 0–1)
BILIRUB CONJ SERPL-MCNC: 0.2 MG/DL (ref 0–0.2)
BILIRUB SERPL-MCNC: 0.5 MG/DL (ref 0.2–1)
BUN SERPL-MCNC: 13 MG/DL (ref 7–18)
CALCIUM SERPL-MCNC: 9.6 MG/DL (ref 8.5–10.1)
CHLORIDE SERPL-SCNC: 104 MEQ/L (ref 98–107)
CO2 SERPL-SCNC: 28 MEQ/L (ref 21–32)
CREAT SERPL-MCNC: 0.66 MG/DL (ref 0.55–1.3)
CRP SERPL-MCNC: 0.85 MG/DL (ref 0–0.3)
EOSINOPHIL # BLD AUTO: 0 10^3/UL (ref 0–0.5)
EOSINOPHIL NFR BLD AUTO: 0.4 % (ref 0–3)
ERYTHROCYTE [SEDIMENTATION RATE] IN BLOOD BY WESTERGREN METHOD: 11 MM/HR (ref 0–20)
GFR SERPL CREATININE-BSD FRML MDRD: > 60 ML/MIN/{1.73_M2} (ref 60–?)
GLUCOSE SERPL-MCNC: 78 MG/DL (ref 70–100)
HCT VFR BLD AUTO: 39.2 % (ref 36–47)
HGB BLD-MCNC: 12.4 G/DL (ref 12–15.5)
LIPASE SERPL-CCNC: 45 U/L (ref 73–393)
LYMPHOCYTES # BLD AUTO: 2.1 10^3/UL (ref 1.5–5)
LYMPHOCYTES NFR BLD AUTO: 21.4 % (ref 24–44)
MCH RBC QN AUTO: 29.4 PG (ref 27–33)
MCHC RBC AUTO-ENTMCNC: 31.6 G/DL (ref 32–36.5)
MCV RBC AUTO: 92.9 FL (ref 80–96)
MONOCYTES # BLD AUTO: 0.7 10^3/UL (ref 0–0.8)
MONOCYTES NFR BLD AUTO: 7.3 % (ref 2–8)
NEUTROPHILS # BLD AUTO: 6.9 10^3/UL (ref 1.5–8.5)
NEUTROPHILS NFR BLD AUTO: 70.4 % (ref 36–66)
PLATELET # BLD AUTO: 559 10^3/UL (ref 150–450)
POTASSIUM SERPL-SCNC: 4.7 MEQ/L (ref 3.5–5.1)
PROT SERPL-MCNC: 7.6 GM/DL (ref 6.4–8.2)
RBC # BLD AUTO: 4.22 10^6/UL (ref 4–5.4)
SODIUM SERPL-SCNC: 139 MEQ/L (ref 136–145)
WBC # BLD AUTO: 9.9 10^3/UL (ref 4–10)

## 2022-02-19 PROCEDURE — 96361 HYDRATE IV INFUSION ADD-ON: CPT

## 2022-02-19 PROCEDURE — 96374 THER/PROPH/DIAG INJ IV PUSH: CPT

## 2022-02-19 PROCEDURE — 99284 EMERGENCY DEPT VISIT MOD MDM: CPT

## 2022-02-19 PROCEDURE — 80048 BASIC METABOLIC PNL TOTAL CA: CPT

## 2022-02-19 PROCEDURE — 85652 RBC SED RATE AUTOMATED: CPT

## 2022-02-19 PROCEDURE — 86140 C-REACTIVE PROTEIN: CPT

## 2022-02-19 PROCEDURE — 83690 ASSAY OF LIPASE: CPT

## 2022-02-19 PROCEDURE — 87798 DETECT AGENT NOS DNA AMP: CPT

## 2022-02-19 PROCEDURE — 80076 HEPATIC FUNCTION PANEL: CPT

## 2022-02-19 PROCEDURE — 85025 COMPLETE CBC W/AUTO DIFF WBC: CPT

## 2022-02-19 PROCEDURE — 96375 TX/PRO/DX INJ NEW DRUG ADDON: CPT
